# Patient Record
Sex: MALE | Race: BLACK OR AFRICAN AMERICAN | NOT HISPANIC OR LATINO | Employment: FULL TIME | URBAN - METROPOLITAN AREA
[De-identification: names, ages, dates, MRNs, and addresses within clinical notes are randomized per-mention and may not be internally consistent; named-entity substitution may affect disease eponyms.]

---

## 2023-05-14 ENCOUNTER — HOSPITAL ENCOUNTER (EMERGENCY)
Facility: HOSPITAL | Age: 53
Discharge: HOME | End: 2023-05-14
Attending: EMERGENCY MEDICINE | Admitting: EMERGENCY MEDICINE
Payer: COMMERCIAL

## 2023-05-14 ENCOUNTER — APPOINTMENT (OUTPATIENT)
Dept: RADIOLOGY | Facility: HOSPITAL | Age: 53
End: 2023-05-14
Attending: EMERGENCY MEDICINE
Payer: COMMERCIAL

## 2023-05-14 VITALS
BODY MASS INDEX: 25.18 KG/M2 | HEIGHT: 69 IN | WEIGHT: 170 LBS | HEART RATE: 81 BPM | DIASTOLIC BLOOD PRESSURE: 69 MMHG | TEMPERATURE: 97.9 F | OXYGEN SATURATION: 96 % | SYSTOLIC BLOOD PRESSURE: 131 MMHG | RESPIRATION RATE: 25 BRPM

## 2023-05-14 DIAGNOSIS — R07.89 CHEST WALL PAIN: ICD-10-CM

## 2023-05-14 LAB
ALBUMIN SERPL BCG-MCNC: 4.3 G/DL (ref 3.5–5.2)
ALP SERPL-CCNC: 76 U/L (ref 40–129)
ALT SERPL W P-5'-P-CCNC: 19 U/L (ref 10–50)
ANION GAP SERPL CALCULATED.3IONS-SCNC: 12 MMOL/L (ref 7–15)
AST SERPL W P-5'-P-CCNC: 28 U/L (ref 10–50)
BASOPHILS # BLD AUTO: 0 10E3/UL (ref 0–0.2)
BASOPHILS NFR BLD AUTO: 1 %
BILIRUB SERPL-MCNC: 0.2 MG/DL
BUN SERPL-MCNC: 10.7 MG/DL (ref 6–20)
CALCIUM SERPL-MCNC: 9.5 MG/DL (ref 8.6–10)
CHLORIDE SERPL-SCNC: 110 MMOL/L (ref 98–107)
CREAT SERPL-MCNC: 1.03 MG/DL (ref 0.67–1.17)
D DIMER PPP FEU-MCNC: 0.41 UG/ML FEU (ref 0–0.5)
DEPRECATED HCO3 PLAS-SCNC: 21 MMOL/L (ref 22–29)
EOSINOPHIL # BLD AUTO: 0.3 10E3/UL (ref 0–0.7)
EOSINOPHIL NFR BLD AUTO: 3 %
ERYTHROCYTE [DISTWIDTH] IN BLOOD BY AUTOMATED COUNT: 14 % (ref 10–15)
GFR SERPL CREATININE-BSD FRML MDRD: 87 ML/MIN/1.73M2
GLUCOSE SERPL-MCNC: 99 MG/DL (ref 70–99)
HCT VFR BLD AUTO: 35.5 % (ref 40–53)
HGB BLD-MCNC: 12.5 G/DL (ref 13.3–17.7)
HOLD SPECIMEN: NORMAL
IMM GRANULOCYTES # BLD: 0 10E3/UL
IMM GRANULOCYTES NFR BLD: 0 %
LIPASE SERPL-CCNC: 58 U/L (ref 13–60)
LYMPHOCYTES # BLD AUTO: 2.1 10E3/UL (ref 0.8–5.3)
LYMPHOCYTES NFR BLD AUTO: 26 %
MCH RBC QN AUTO: 30.2 PG (ref 26.5–33)
MCHC RBC AUTO-ENTMCNC: 35.2 G/DL (ref 31.5–36.5)
MCV RBC AUTO: 86 FL (ref 78–100)
MONOCYTES # BLD AUTO: 0.9 10E3/UL (ref 0–1.3)
MONOCYTES NFR BLD AUTO: 12 %
NEUTROPHILS # BLD AUTO: 4.6 10E3/UL (ref 1.6–8.3)
NEUTROPHILS NFR BLD AUTO: 58 %
NRBC # BLD AUTO: 0 10E3/UL
NRBC BLD AUTO-RTO: 0 /100
NT-PROBNP SERPL-MCNC: <36 PG/ML (ref 0–900)
PLATELET # BLD AUTO: 314 10E3/UL (ref 150–450)
POTASSIUM SERPL-SCNC: 4.3 MMOL/L (ref 3.4–5.3)
PROT SERPL-MCNC: 7.1 G/DL (ref 6.4–8.3)
RBC # BLD AUTO: 4.14 10E6/UL (ref 4.4–5.9)
SODIUM SERPL-SCNC: 143 MMOL/L (ref 136–145)
TROPONIN T SERPL HS-MCNC: 16 NG/L
TROPONIN T SERPL HS-MCNC: 19 NG/L
WBC # BLD AUTO: 7.9 10E3/UL (ref 4–11)

## 2023-05-14 PROCEDURE — 71046 X-RAY EXAM CHEST 2 VIEWS: CPT

## 2023-05-14 PROCEDURE — 83690 ASSAY OF LIPASE: CPT | Performed by: EMERGENCY MEDICINE

## 2023-05-14 PROCEDURE — 85379 FIBRIN DEGRADATION QUANT: CPT | Performed by: EMERGENCY MEDICINE

## 2023-05-14 PROCEDURE — 84484 ASSAY OF TROPONIN QUANT: CPT | Mod: 91 | Performed by: EMERGENCY MEDICINE

## 2023-05-14 PROCEDURE — 85025 COMPLETE CBC W/AUTO DIFF WBC: CPT | Performed by: EMERGENCY MEDICINE

## 2023-05-14 PROCEDURE — 250N000011 HC RX IP 250 OP 636: Performed by: EMERGENCY MEDICINE

## 2023-05-14 PROCEDURE — 80053 COMPREHEN METABOLIC PANEL: CPT | Performed by: EMERGENCY MEDICINE

## 2023-05-14 PROCEDURE — 84484 ASSAY OF TROPONIN QUANT: CPT | Performed by: EMERGENCY MEDICINE

## 2023-05-14 PROCEDURE — 83880 ASSAY OF NATRIURETIC PEPTIDE: CPT | Performed by: EMERGENCY MEDICINE

## 2023-05-14 PROCEDURE — 93005 ELECTROCARDIOGRAM TRACING: CPT | Performed by: EMERGENCY MEDICINE

## 2023-05-14 PROCEDURE — 99285 EMERGENCY DEPT VISIT HI MDM: CPT | Mod: 25

## 2023-05-14 PROCEDURE — 36415 COLL VENOUS BLD VENIPUNCTURE: CPT | Performed by: EMERGENCY MEDICINE

## 2023-05-14 PROCEDURE — 96374 THER/PROPH/DIAG INJ IV PUSH: CPT

## 2023-05-14 RX ORDER — KETOROLAC TROMETHAMINE 15 MG/ML
15 INJECTION, SOLUTION INTRAMUSCULAR; INTRAVENOUS ONCE
Status: COMPLETED | OUTPATIENT
Start: 2023-05-14 | End: 2023-05-14

## 2023-05-14 RX ADMIN — KETOROLAC TROMETHAMINE 15 MG: 15 INJECTION, SOLUTION INTRAMUSCULAR; INTRAVENOUS at 04:15

## 2023-05-14 ASSESSMENT — ACTIVITIES OF DAILY LIVING (ADL)
ADLS_ACUITY_SCORE: 35
ADLS_ACUITY_SCORE: 35

## 2023-05-14 NOTE — DISCHARGE INSTRUCTIONS
Follow-up with your primary care doctor next week for reevaluation.  Discuss your symptoms and whether they would like to do further outpatient evaluation of your heart.  You can take Tylenol and ibuprofen for pain at home.    Return to the ER for any new or worsening concerns.

## 2023-05-14 NOTE — ED NOTES
Bed: JNED-36  Expected date: 5/14/23  Expected time: 1:22 AM  Means of arrival: Ambulance  Comments:  SPF24 - 52yom cp , EKG unremarkable, vss, asa, ntg

## 2023-05-14 NOTE — ED TRIAGE NOTES
Pt c/o cp for the past 1.5 days; no pain with palpation, it is not reproduce able; sharp in nature and does not radiate; pt denies SOB and N/V; pt has cough; vitals from ems stable and EKG is unremarkable NSR. Pt takes 324mg ASA BID.     Triage Assessment     Row Name 05/14/23 0152       Triage Assessment (Adult)    Airway WDL WDL    Additional Documentation Breath Sounds (Group)       Respiratory WDL    Respiratory WDL WDL       Breath Sounds    Breath Sounds All Fields    All Lung Fields Breath Sounds clear       Skin Circulation/Temperature WDL    Skin Circulation/Temperature WDL WDL       Cardiac WDL    Cardiac WDL WDL       Peripheral/Neurovascular WDL    Peripheral Neurovascular WDL WDL       Cognitive/Neuro/Behavioral WDL    Cognitive/Neuro/Behavioral WDL WDL

## 2023-05-14 NOTE — ED PROVIDER NOTES
EMERGENCY DEPARTMENT ENCOUNTER      NAME: Emmett Mora  AGE: 52 year old male  YOB: 1970  MRN: [id number]  EVALUATION DATE & TIME: 5/14/2023  1:41 AM    PCP: No primary care provider on file.    ED PROVIDER: Tere Cerna MD      Chief Complaint   Patient presents with     Chest Pain     Pt c/o cp for the past 1.5 days; no pain with palpation, it is not reproduce able; sharp in nature and does not radiate; pt denies SOB and N/V; pt has cough; vitals from ems stable and EKG is unremarkable NSR. Pt takes 324mg ASA BID.         FINAL IMPRESSION:  1. Chest wall pain          ED COURSE & MEDICAL DECISION MAKING:    Pertinent Labs & Imaging studies reviewed. (See chart for details)    3:22 AM I introduced myself to the patient, obtained patient history, performed a physical exam, and discussed plan for ED workup including potential diagnostic laboratory/imaging studies and interventions.    52 year old male presents to the Emergency Department for evaluation of left sided chest pain.  On exam, patient is overall well-appearing and in no acute distress.  He does report that the pain on the left side of his chest does get worse when he moves his arms or legs in bed.  It is somewhat reproducible on exam along the left sternum.  He did have recent long travel to [place].  He already took a full aspirin today.  Vital signs are within normal limits and he is afebrile.  Differential diagnosis includes but is not limited to acute coronary syndrome, pericarditis, pneumonia, PE, pneumothorax, pulmonary edema, musculoskeletal etiology such as costochondritis, viral illness, less likely dissection with the fact that it is reproducible, etc.  EKG was obtained which does not reveal any evidence of acute ischemia.  No sign of pericarditis. Initial troponin is 19 with repeat being 16 both within normal limits and high-sensitivity troponins.  He is overall low risk heart score and do feel that he is appropriate for  outpatient follow-up for further cardiac evaluation as a result.  BNP is within normal limits making pulmonary edema less likely.  CBC reveals white blood cell count of 7.9 with a hemoglobin of 12.5.  CMP largely unremarkable.  Lipase within normal limits.  Does not have any abdominal tenderness thus felt that abdominal pathology is less likely.  Also considered GERD but overall with his presentation this does appear somewhat less likely as well.  Did obtain a D-dimer with his recent long travel which is within normal limits making PE unlikely.  Chest x-ray was obtained does not reveal any pneumonia, pneumothorax, or other acute pathology.  He was given 15 mg of IV Toradol here and states his pain is entirely resolved.  Do suspect a musculoskeletal component with the fact that  it is worse with movement of the chest and arm.  Discussed with the patient that we do not know the exact etiology of his pain.  He would like to be discharged at this time as he is feeling improved.  Discussed the importance of following up with primary care for reevaluation and possible further cardiac evaluation.  He was given indications for return the emergency department.  He voiced understanding was comfortable with this plan.  He was discharged in stable condition.         At the conclusion of the encounter I discussed the results of all of the tests and the disposition. The questions were answered. The patient or family acknowledged understanding and was agreeable with the care plan.         Medical Decision Making    History:    Supplemental history from: N/A     External Record(s) reviewed: Documented in chart, if applicable.    Work Up:    Chart documentation includes differential considered and any EKGs or imaging independently interpreted by provider.    In additional to work up documented, I considered the following work up: Documented in chart, if applicable.    External consultation:    Discussion of management with another  provider: Documented in chart, if applicable    Complicating factors:    Care impacted by chronic illness: Chronic Lung Disease    Care affected by social determinants of health: N/A    Disposition considerations: Discharge. No recommendations on prescription strength medication(s). See documentation for any additional details.      MEDICATIONS GIVEN IN THE EMERGENCY:  Medications   ketorolac (TORADOL) injection 15 mg (15 mg Intravenous $Given 5/14/23 6428)       NEW PRESCRIPTIONS STARTED AT TODAY'S ER VISIT  There are no discharge medications for this patient.         =================================================================    [facility]    Patient information was obtained from: Patient    Use of : N/A      Emmett Mora is a 52 year old male with a pertinent history of asthma who presents to this ED for evaluation of chest pain.    The patient reports 2 days of left sided chest pain. The pain has been constant since onset and is worse with movement or coughing. The pain is improved when lying on his right side. The pain is nonradiating. He has not had any associated shortness of breath, diaphoresis, or nausea. He denies any history of similar pain. He has tired Excedrin and aspirin for the pain without relief, last dose of Excedrin was 1800 yesterday. He has not sustained any trauma to the chest wall. He recently traveled to [place]. Reports some pleuritic nature to the pain. Not worse when lying down. Reports slight cough. He took a full aspirin today.     He reports a history of asthma and uses albuterol and steroid inhalers at home. He has not had any wheezing.    He has otherwise been feeling his normal self and denies any fever, vomiting, diarrhea, abdominal pain, focal numbness or weakness, back pain, and any other complaints at this time.    REVIEW OF SYSTEMS   Review of Systems   Pertinent positives and negatives are documented in the HPI. All other systems reviewed and are  "negative.      PAST MEDICAL HISTORY:  History reviewed. No pertinent past medical history.    PAST SURGICAL HISTORY:  History reviewed. No pertinent surgical history.        CURRENT MEDICATIONS:    No current outpatient medications on file.      ALLERGIES:  No Known Allergies    FAMILY HISTORY:  History reviewed. No pertinent family history.    SOCIAL HISTORY:        VITALS:  /69   Pulse 81   Temp 97.9  F (36.6  C) (Oral)   Resp 25   Ht 1.753 m (5' 9\")   Wt 77.1 kg (170 lb)   SpO2 96%   BMI 25.10 kg/m      PHYSICAL EXAM    Physical Exam  Constitutional: Well developed, Well nourished, NAD, GCS 15  HENT: Normocephalic, Atraumatic, Bilateral external ears normal, Oropharynx normal, mucous membranes moist, Nose normal. Neck- Normal range of motion, No tenderness, Supple, No stridor.   Eyes: PERRL, EOMI, Conjunctiva normal, No discharge.   Respiratory: Normal breath sounds, No respiratory distress, No wheezing or crackles, Speaks in full sentences easily.    Cardiovascular: Normal heart rate, Regular rhythm, No murmurs, No rubs, No gallops. 2+ radial pulses bilaterally. Does have some left sided reproducible chest tenderness along the sternum.   GI: Bowel sounds normal, Soft, No tenderness, No masses, No rebound or guarding. No CVA tenderness bilaterally   Musculoskeletal: 2+ DP pulses. No notable lower extremity edema. No cyanosis, No clubbing. Good range of motion in all major joints. No tenderness to palpation or major deformities noted. No tenderness of the CTLS spine.   Integument: Warm, Dry, No erythema, No rash. No petechiae.    Neurologic: Alert & oriented x 3, 5/5 strength in all 4 extremities bilaterally. Sensation intact to light touch in all 4 extremities and the face bilaterally. No focal deficits noted.     Psychiatric: Affect normal, Judgment normal, Mood normal. Cooperative.      LAB:  All pertinent labs reviewed and interpreted.  Results for orders placed or performed during the hospital " encounter of 05/14/23   Chest XR,  PA & LAT    Impression    IMPRESSION: The patient is rotated. Both lungs are clear. No adenopathy or effusion. Normal cardiac size and pulmonary vascularity. No fracture. Monitoring leads overlying the chest. No prior study available for comparison. Current study will serve as a   baseline for future follow-up.   Comprehensive metabolic panel   Result Value Ref Range    Sodium 143 136 - 145 mmol/L    Potassium 4.3 3.4 - 5.3 mmol/L    Chloride 110 (H) 98 - 107 mmol/L    Carbon Dioxide (CO2) 21 (L) 22 - 29 mmol/L    Anion Gap 12 7 - 15 mmol/L    Urea Nitrogen 10.7 6.0 - 20.0 mg/dL    Creatinine 1.03 0.67 - 1.17 mg/dL    Calcium 9.5 8.6 - 10.0 mg/dL    Glucose 99 70 - 99 mg/dL    Alkaline Phosphatase 76 40 - 129 U/L    AST 28 10 - 50 U/L    ALT 19 10 - 50 U/L    Protein Total 7.1 6.4 - 8.3 g/dL    Albumin 4.3 3.5 - 5.2 g/dL    Bilirubin Total 0.2 <=1.2 mg/dL    GFR Estimate 87 >60 mL/min/1.73m2   Result Value Ref Range    Troponin T, High Sensitivity 19 <=22 ng/L   Nt probnp inpatient (BNP)   Result Value Ref Range    N terminal Pro BNP Inpatient <36 0 - 900 pg/mL   CBC with platelets and differential   Result Value Ref Range    WBC Count 7.9 4.0 - 11.0 10e3/uL    RBC Count 4.14 (L) 4.40 - 5.90 10e6/uL    Hemoglobin 12.5 (L) 13.3 - 17.7 g/dL    Hematocrit 35.5 (L) 40.0 - 53.0 %    MCV 86 78 - 100 fL    MCH 30.2 26.5 - 33.0 pg    MCHC 35.2 31.5 - 36.5 g/dL    RDW 14.0 10.0 - 15.0 %    Platelet Count 314 150 - 450 10e3/uL    % Neutrophils 58 %    % Lymphocytes 26 %    % Monocytes 12 %    % Eosinophils 3 %    % Basophils 1 %    % Immature Granulocytes 0 %    NRBCs per 100 WBC 0 <1 /100    Absolute Neutrophils 4.6 1.6 - 8.3 10e3/uL    Absolute Lymphocytes 2.1 0.8 - 5.3 10e3/uL    Absolute Monocytes 0.9 0.0 - 1.3 10e3/uL    Absolute Eosinophils 0.3 0.0 - 0.7 10e3/uL    Absolute Basophils 0.0 0.0 - 0.2 10e3/uL    Absolute Immature Granulocytes 0.0 <=0.4 10e3/uL    Absolute NRBCs 0.0 10e3/uL    Extra Blue Top Tube   Result Value Ref Range    Hold Specimen JIC    Result Value Ref Range    Lipase 58 13 - 60 U/L   Result Value Ref Range    Troponin T, High Sensitivity 16 <=22 ng/L   D dimer quantitative   Result Value Ref Range    D-Dimer Quantitative 0.41 0.00 - 0.50 ug/mL FEU   ECG 12-LEAD WITH MUSE (LHE)   Result Value Ref Range    Systolic Blood Pressure  mmHg    Diastolic Blood Pressure  mmHg    Ventricular Rate 78 BPM    Atrial Rate 78 BPM    AR Interval 148 ms    QRS Duration 76 ms     ms    QTc 414 ms    P Axis 79 degrees    R AXIS 14 degrees    T Axis 57 degrees    Interpretation ECG       Sinus rhythm  Possible Inferior infarct , age undetermined  Abnormal ECG  No previous ECGs available  Confirmed by SEE ED PROVIDER NOTE FOR, ECG INTERPRETATION ([id number]),  TWYLA FARFAN ([id number]) on 5/15/2023 4:20:20 AM         RADIOLOGY:  Reviewed all pertinent imaging. Please see official radiology report.  Chest XR,  PA & LAT   Final Result   IMPRESSION: The patient is rotated. Both lungs are clear. No adenopathy or effusion. Normal cardiac size and pulmonary vascularity. No fracture. Monitoring leads overlying the chest. No prior study available for comparison. Current study will serve as a    baseline for future follow-up.          EKG:    Performed at: 1:42 am    Impression: Sinus rhythm. No signs of acute ischemia.     Rate: 78  Rhythm: Sinus  Axis: Normal  AR Interval: 148  QRS Interval: 76  QTc Interval: 414  ST Changes: None  Comparison: None    I have independently reviewed and interpreted the EKG(s) documented above.    PROCEDURES:   None      [employer] Documentation:   CMS Diagnoses:               IRobinson, am serving as a scribe to document services personally performed by Tere Cerna MD based on my observation and the provider's statements to me. ITere MD, attest that Robinson Bourgeois is acting in a scribe capacity, has observed my performance of  the services and has documented them in accordance with my direction.    Tere Cerna MD  [facility] EMERGENCY DEPARTMENT  [facility] [facility] [id number] [phone]     Tere Cerna MD  05/15/23 1165

## 2023-05-15 LAB
ATRIAL RATE - MUSE: 78 BPM
DIASTOLIC BLOOD PRESSURE - MUSE: NORMAL MMHG
INTERPRETATION ECG - MUSE: NORMAL
P AXIS - MUSE: 79 DEGREES
PR INTERVAL - MUSE: 148 MS
QRS DURATION - MUSE: 76 MS
QT - MUSE: 364 MS
QTC - MUSE: 414 MS
R AXIS - MUSE: 14 DEGREES
SYSTOLIC BLOOD PRESSURE - MUSE: NORMAL MMHG
T AXIS - MUSE: 57 DEGREES
VENTRICULAR RATE- MUSE: 78 BPM

## 2023-08-28 ENCOUNTER — HOSPITAL ENCOUNTER (EMERGENCY)
Facility: CLINIC | Age: 53
Discharge: HOME | End: 2023-08-28
Attending: EMERGENCY MEDICINE | Admitting: EMERGENCY MEDICINE
Payer: COMMERCIAL

## 2023-08-28 ENCOUNTER — APPOINTMENT (OUTPATIENT)
Dept: RADIOLOGY | Facility: CLINIC | Age: 53
End: 2023-08-28
Attending: EMERGENCY MEDICINE
Payer: COMMERCIAL

## 2023-08-28 ENCOUNTER — APPOINTMENT (OUTPATIENT)
Dept: CT IMAGING | Facility: CLINIC | Age: 53
End: 2023-08-28
Attending: EMERGENCY MEDICINE
Payer: COMMERCIAL

## 2023-08-28 VITALS
BODY MASS INDEX: 31.86 KG/M2 | TEMPERATURE: 98.4 F | WEIGHT: 203 LBS | DIASTOLIC BLOOD PRESSURE: 80 MMHG | SYSTOLIC BLOOD PRESSURE: 153 MMHG | OXYGEN SATURATION: 96 % | HEART RATE: 99 BPM | HEIGHT: 67 IN | RESPIRATION RATE: 18 BRPM

## 2023-08-28 DIAGNOSIS — J45.901 EXACERBATION OF ASTHMA, UNSPECIFIED ASTHMA SEVERITY, UNSPECIFIED WHETHER PERSISTENT: ICD-10-CM

## 2023-08-28 DIAGNOSIS — R07.9 CHEST PAIN, UNSPECIFIED TYPE: ICD-10-CM

## 2023-08-28 LAB
ALBUMIN SERPL-MCNC: 3.6 G/DL (ref 3.5–5)
ALP SERPL-CCNC: 77 U/L (ref 45–120)
ALT SERPL W P-5'-P-CCNC: 19 U/L (ref 0–45)
ANION GAP SERPL CALCULATED.3IONS-SCNC: 10 MMOL/L (ref 5–18)
AST SERPL W P-5'-P-CCNC: 22 U/L (ref 0–40)
ATRIAL RATE - MUSE: 97 BPM
BASOPHILS # BLD AUTO: 0 10E3/UL (ref 0–0.2)
BASOPHILS NFR BLD AUTO: 0 %
BILIRUB SERPL-MCNC: 0.4 MG/DL (ref 0–1)
BUN SERPL-MCNC: 9 MG/DL (ref 8–22)
CALCIUM SERPL-MCNC: 9.2 MG/DL (ref 8.5–10.5)
CHLORIDE BLD-SCNC: 109 MMOL/L (ref 98–107)
CO2 SERPL-SCNC: 24 MMOL/L (ref 22–31)
CREAT SERPL-MCNC: 0.95 MG/DL (ref 0.7–1.3)
D DIMER PPP FEU-MCNC: 0.54 UG/ML FEU (ref 0–0.5)
DIASTOLIC BLOOD PRESSURE - MUSE: NORMAL MMHG
EOSINOPHIL # BLD AUTO: 0.4 10E3/UL (ref 0–0.7)
EOSINOPHIL NFR BLD AUTO: 4 %
ERYTHROCYTE [DISTWIDTH] IN BLOOD BY AUTOMATED COUNT: 15.3 % (ref 10–15)
FLUAV RNA SPEC QL NAA+PROBE: NEGATIVE
FLUBV RNA RESP QL NAA+PROBE: NEGATIVE
GFR SERPL CREATININE-BSD FRML MDRD: >90 ML/MIN/1.73M2
GLUCOSE BLD-MCNC: 99 MG/DL (ref 70–125)
HCT VFR BLD AUTO: 34.9 % (ref 40–53)
HGB BLD-MCNC: 12.3 G/DL (ref 13.3–17.7)
IMM GRANULOCYTES # BLD: 0 10E3/UL
IMM GRANULOCYTES NFR BLD: 0 %
INTERPRETATION ECG - MUSE: NORMAL
LIPASE SERPL-CCNC: 75 U/L (ref 0–52)
LYMPHOCYTES # BLD AUTO: 1.9 10E3/UL (ref 0.8–5.3)
LYMPHOCYTES NFR BLD AUTO: 20 %
MCH RBC QN AUTO: 29.6 PG (ref 26.5–33)
MCHC RBC AUTO-ENTMCNC: 35.2 G/DL (ref 31.5–36.5)
MCV RBC AUTO: 84 FL (ref 78–100)
MONOCYTES # BLD AUTO: 0.8 10E3/UL (ref 0–1.3)
MONOCYTES NFR BLD AUTO: 8 %
NEUTROPHILS # BLD AUTO: 6.6 10E3/UL (ref 1.6–8.3)
NEUTROPHILS NFR BLD AUTO: 68 %
NRBC # BLD AUTO: 0 10E3/UL
NRBC BLD AUTO-RTO: 0 /100
P AXIS - MUSE: 79 DEGREES
PLATELET # BLD AUTO: 467 10E3/UL (ref 150–450)
POTASSIUM BLD-SCNC: 4.6 MMOL/L (ref 3.5–5)
PR INTERVAL - MUSE: 138 MS
PROT SERPL-MCNC: 7.6 G/DL (ref 6–8)
QRS DURATION - MUSE: 76 MS
QT - MUSE: 342 MS
QTC - MUSE: 434 MS
R AXIS - MUSE: 12 DEGREES
RBC # BLD AUTO: 4.16 10E6/UL (ref 4.4–5.9)
RSV RNA SPEC NAA+PROBE: NEGATIVE
SARS-COV-2 RNA RESP QL NAA+PROBE: NEGATIVE
SODIUM SERPL-SCNC: 143 MMOL/L (ref 136–145)
SYSTOLIC BLOOD PRESSURE - MUSE: NORMAL MMHG
T AXIS - MUSE: 47 DEGREES
TROPONIN I SERPL-MCNC: <0.01 NG/ML (ref 0–0.29)
TROPONIN I SERPL-MCNC: <0.01 NG/ML (ref 0–0.29)
VENTRICULAR RATE- MUSE: 97 BPM
WBC # BLD AUTO: 9.8 10E3/UL (ref 4–11)

## 2023-08-28 PROCEDURE — 83690 ASSAY OF LIPASE: CPT | Performed by: EMERGENCY MEDICINE

## 2023-08-28 PROCEDURE — 84484 ASSAY OF TROPONIN QUANT: CPT | Performed by: EMERGENCY MEDICINE

## 2023-08-28 PROCEDURE — 85025 COMPLETE CBC W/AUTO DIFF WBC: CPT | Performed by: EMERGENCY MEDICINE

## 2023-08-28 PROCEDURE — 85379 FIBRIN DEGRADATION QUANT: CPT | Performed by: EMERGENCY MEDICINE

## 2023-08-28 PROCEDURE — 71046 X-RAY EXAM CHEST 2 VIEWS: CPT

## 2023-08-28 PROCEDURE — 71275 CT ANGIOGRAPHY CHEST: CPT

## 2023-08-28 PROCEDURE — 80053 COMPREHEN METABOLIC PANEL: CPT | Performed by: EMERGENCY MEDICINE

## 2023-08-28 PROCEDURE — 36415 COLL VENOUS BLD VENIPUNCTURE: CPT | Performed by: EMERGENCY MEDICINE

## 2023-08-28 PROCEDURE — 250N000009 HC RX 250: Performed by: EMERGENCY MEDICINE

## 2023-08-28 PROCEDURE — 94640 AIRWAY INHALATION TREATMENT: CPT

## 2023-08-28 PROCEDURE — 87637 SARSCOV2&INF A&B&RSV AMP PRB: CPT | Performed by: EMERGENCY MEDICINE

## 2023-08-28 PROCEDURE — 250N000011 HC RX IP 250 OP 636: Mod: JZ | Performed by: EMERGENCY MEDICINE

## 2023-08-28 PROCEDURE — 250N000012 HC RX MED GY IP 250 OP 636 PS 637: Performed by: EMERGENCY MEDICINE

## 2023-08-28 PROCEDURE — 93005 ELECTROCARDIOGRAM TRACING: CPT | Performed by: EMERGENCY MEDICINE

## 2023-08-28 PROCEDURE — 99285 EMERGENCY DEPT VISIT HI MDM: CPT | Mod: 25

## 2023-08-28 RX ORDER — IOPAMIDOL 755 MG/ML
75 INJECTION, SOLUTION INTRAVASCULAR ONCE
Status: COMPLETED | OUTPATIENT
Start: 2023-08-28 | End: 2023-08-28

## 2023-08-28 RX ORDER — PREDNISONE 20 MG/1
TABLET ORAL
Qty: 10 TABLET | Refills: 0 | Status: SHIPPED | OUTPATIENT
Start: 2023-08-28

## 2023-08-28 RX ORDER — IOPAMIDOL 755 MG/ML
100 INJECTION, SOLUTION INTRAVASCULAR ONCE
Status: DISCONTINUED | OUTPATIENT
Start: 2023-08-28 | End: 2023-08-28

## 2023-08-28 RX ORDER — PREDNISONE 20 MG/1
60 TABLET ORAL ONCE
Status: COMPLETED | OUTPATIENT
Start: 2023-08-28 | End: 2023-08-28

## 2023-08-28 RX ORDER — IPRATROPIUM BROMIDE AND ALBUTEROL SULFATE 2.5; .5 MG/3ML; MG/3ML
3 SOLUTION RESPIRATORY (INHALATION) ONCE
Status: COMPLETED | OUTPATIENT
Start: 2023-08-28 | End: 2023-08-28

## 2023-08-28 RX ORDER — ALBUTEROL SULFATE 90 UG/1
2 AEROSOL, METERED RESPIRATORY (INHALATION) EVERY 6 HOURS PRN
Qty: 18 G | Refills: 0 | Status: SHIPPED | OUTPATIENT
Start: 2023-08-28

## 2023-08-28 RX ADMIN — PREDNISONE 60 MG: 20 TABLET ORAL at 19:37

## 2023-08-28 RX ADMIN — IPRATROPIUM BROMIDE AND ALBUTEROL SULFATE 3 ML: .5; 3 SOLUTION RESPIRATORY (INHALATION) at 19:37

## 2023-08-28 RX ADMIN — IOPAMIDOL 75 ML: 755 INJECTION, SOLUTION INTRAVENOUS at 21:05

## 2023-08-28 ASSESSMENT — ACTIVITIES OF DAILY LIVING (ADL)
ADLS_ACUITY_SCORE: 35
ADLS_ACUITY_SCORE: 35

## 2023-08-28 ASSESSMENT — HEART SCORE
HEART SCORE: 3
ECG: NORMAL
AGE: 45-64
RISK FACTORS: >2 RISK FACTORS OR HX OF ATHEROSCLEROTIC DISEASE
HISTORY: SLIGHTLY SUSPICIOUS
TROPONIN: LESS THAN OR EQUAL TO NORMAL LIMIT

## 2023-08-28 NOTE — ED TRIAGE NOTES
"Patient presents to ED with numbness to legs and arms for the past weeks, having some intermittent SOB and chest pain since yesterday, and intermittent feeling that it is \"hot behind my eyes\".  Kena Andrade RN.......8/28/2023 5:22 PM     Triage Assessment       Row Name 08/28/23 1774       Triage Assessment (Adult)    Airway WDL WDL       Respiratory WDL    Respiratory WDL WDL       Skin Circulation/Temperature WDL    Skin Circulation/Temperature WDL WDL       Cardiac WDL    Cardiac WDL X;chest pain       Peripheral/Neurovascular WDL    Peripheral Neurovascular WDL WDL       Cognitive/Neuro/Behavioral WDL    Cognitive/Neuro/Behavioral WDL WDL                    "

## 2023-08-28 NOTE — Clinical Note
Emmett Mora was seen and treated in our emergency department on 8/28/2023.  He may return to work on 08/30/2023.       If you have any questions or concerns, please don't hesitate to call.      Marissa Carrera MD

## 2023-08-28 NOTE — ED PROVIDER NOTES
"EMERGENCY DEPARTMENT ENCOUNTER      NAME: Emmett Mora  AGE: 52 year old male  YOB: 1970  MRN: [id number]  EVALUATION DATE & TIME: No admission date for patient encounter.    PCP: No Ref-Primary, Physician    ED PROVIDER: Marissa Carrera M.D.      Chief Complaint   Patient presents with    Chest Pain    Shortness of Breath    Numbness         FINAL IMPRESSION:  1. Exacerbation of asthma, unspecified asthma severity, unspecified whether persistent    2. Chest pain, unspecified type          ED COURSE & MEDICAL DECISION MAKING:    ED Course as of 08/28/23 2304   Mon Aug 28, 2023   1806 Patient with atypical HEART score 3 chest pains for one week, has had them before, and also some SOB with exertion with known asthma and some wheezing, sat 96% RA and RR 18 without retractions, speaking in full sentences, using albuterol MDI without substantial lasting improvement, no recent antibiotic or steroid therapy used, and + daily tobacco abuse. Pt given prednisone orally with duoneb therapy begun, EKG reassuringly WNL, CXR pending. Patient PERC+ as he is over 50 so ddimer pending for r/o PE and troponin pending with CBC and chemistry and will reassess, viral PCR ordered.   1933 Viral PCR negative, CMP and CBC WNL and lipase at upper limit of normal, LFTs WNL, ddimer elevated and CTA ordered to r/o PE. Initial EKG WNL and troponin negative, repeat troponin ordered after 3h delta interval to r/o ACS   1934 CXR reassuringly negative for acute pathology   2236 Repeat troponin reassuringly negative.    2237 CT chest reads \" Bilateral nonspecific upper lobe ground glass infiltrates may represent multifocal interstitial pneumonia (including Covid pneumonitis). Differential considerations include hypersensitivity pneumonia among other etiologies. Clinical correlation and   short-term follow-up post therapy recommended.\" However no cough or fever or elevated WBC to suggest atypical PNA, possibly reactive airway given viral " PCR negative vs. Sequelae of recent viral URTI. Will reassess now.   2572 Patient clinically reassessed with full resolution of wheezing and chest discomfort and feeling fully improved. 2nd troponin negative after 3h delta interval, ACS ruled out with low pretest probability for ACS per HEART score. As he feels markedly improved after steroid and neb therapy and other pathology ruled out reassuringly, he is open to plan to discharge with Rx for refill albuterol MDI inhaler and Rx for prednisone burst therapy for the next 5 days to treat asthma exacerbation which may be superimposed upon viral URTI, Rx to preferred pharmacy. Patient discharged after being provided with extensive anticipatory guidance and given return precautions, importance of PMD follow-up emphasized.        Pertinent Labs & Imaging studies reviewed. (See chart for details)    N95 worn  A face shield was worn also  COVID PPE    Medical Decision Making    History:  Supplemental history from: Documented in chart, if applicable  External Record(s) reviewed: Documented in chart, if applicable.    Work Up:  Chart documentation includes differential considered and any EKGs or imaging independently interpreted by provider, where specified.  In additional to work up documented, I considered the following work up: Documented in chart, if applicable.    External consultation:  Discussion of management with another provider: Documented in chart, if applicable    Complicating factors:  Care impacted by chronic illness: N/A  Care affected by social determinants of health: N/A    Disposition considerations: Discharge. I prescribed additional prescription strength medication(s) as charted. I considered admission, but discharged patient after significant clinical improvement.        At the conclusion of the encounter I discussed the results of all of the tests and the disposition. The questions were answered. The patient or family acknowledged understanding and was  agreeable with the care plan.     MEDICATIONS GIVEN IN THE EMERGENCY:  Medications   predniSONE (DELTASONE) tablet 60 mg (60 mg Oral $Given 8/28/23 1937)   ipratropium - albuterol 0.5 mg/2.5 mg/3 mL (DUONEB) neb solution 3 mL (3 mLs Nebulization $Given 8/28/23 1937)   iopamidol (ISOVUE-370) solution 75 mL (75 mLs Intravenous $Given 8/28/23 2105)       NEW PRESCRIPTIONS STARTED AT TODAY'S ER VISIT  New Prescriptions    ALBUTEROL (PROAIR HFA/PROVENTIL HFA/VENTOLIN HFA) 108 (90 BASE) MCG/ACT INHALER    Inhale 2 puffs into the lungs every 6 hours as needed for shortness of breath or wheezing    PREDNISONE (DELTASONE) 20 MG TABLET    Take two tablets (= 40mg) each day for 5 (five) days          =================================================================    HPI      Emmett Mora is a 52 year old male with PMHx of mild intermittent asthma who presents to the ED today via walk in for evaluation of chest pain, shortness of breath and numbness.     The patient has been having difficulty breathing that started about 1 weeks ago. He does have history of asthma and last took his inhaler was prior to arrival. He endorses some diffuse chest pain and paresthesia to bilateral arms, no numbness. The chest pain comes and goes, when the pain comes it last only for a few minutes. The pain is describe as a cramp sensation. The pain comes frequently when he is sleeping. Currently, he does not have any chest pain 0/10. He admits to smoking cigarette. He otherwise denies any other medical concerns at the moment.     REVIEW OF SYSTEMS   All other systems reviewed and are negative except as noted above in HPI.    PAST MEDICAL HISTORY:  History reviewed. No pertinent past medical history.    PAST SURGICAL HISTORY:  History reviewed. No pertinent surgical history.    CURRENT MEDICATIONS:    albuterol (PROAIR HFA/PROVENTIL HFA/VENTOLIN HFA) 108 (90 Base) MCG/ACT inhaler  predniSONE (DELTASONE) 20 MG tablet        ALLERGIES:  No Known  "Allergies    FAMILY HISTORY:  History reviewed. No pertinent family history.    SOCIAL HISTORY:   Social History     Socioeconomic History    Marital status: Single       VITALS:  Patient Vitals for the past 24 hrs:   BP Temp Temp src Pulse Resp SpO2 Height Weight   08/28/23 1719 (!) 153/80 98.4  F (36.9  C) Oral 99 18 96 % 1.702 m (5' 7\") 92.1 kg (203 lb)       PHYSICAL EXAM    GENERAL: Awake, alert.  In no acute distress.   HEENT: Normocephalic, atraumatic.  Pupils equal, round and reactive.  Conjunctiva normal.  EOMI.  NECK: No stridor or apparent deformity.  PULMONARY: Diffuse mild wheezing. Symmetrical breath sounds without distress.   CARDIO: Regular rate and rhythm.  No significant murmur, rub or gallop.  Radial pulses strong and symmetrical.  ABDOMINAL: Abdomen soft, non-distended and non-tender to palpation.  No CVAT, no palpable hepatosplenomegaly.  EXTREMITIES: No lower extremity swelling or edema.    NEURO: Alert and oriented to person, place and time.  Cranial nerves grossly intact.  No focal motor deficit.  PSYCH: Normal mood and affect  SKIN: No rashes      LAB:  All pertinent labs reviewed and interpreted.  Results for orders placed or performed during the hospital encounter of 08/28/23   XR Chest 2 Views    Impression    IMPRESSION: Negative chest.   CT Chest Pulmonary Embolism w Contrast    Impression    IMPRESSION:  1.  No evidence pulmonary embolus.  2.  Bilateral nonspecific upper lobe ground glass infiltrates may represent multifocal interstitial pneumonia (including Covid pneumonitis). Differential considerations include hypersensitivity pneumonia among other etiologies. Clinical correlation and   short-term follow-up post therapy recommended..   Comprehensive metabolic panel   Result Value Ref Range    Sodium 143 136 - 145 mmol/L    Potassium 4.6 3.5 - 5.0 mmol/L    Chloride 109 (H) 98 - 107 mmol/L    Carbon Dioxide (CO2) 24 22 - 31 mmol/L    Anion Gap 10 5 - 18 mmol/L    Urea Nitrogen 9 8 - " 22 mg/dL    Creatinine 0.95 0.70 - 1.30 mg/dL    Calcium 9.2 8.5 - 10.5 mg/dL    Glucose 99 70 - 125 mg/dL    Alkaline Phosphatase 77 45 - 120 U/L    AST 22 0 - 40 U/L    ALT 19 0 - 45 U/L    Protein Total 7.6 6.0 - 8.0 g/dL    Albumin 3.6 3.5 - 5.0 g/dL    Bilirubin Total 0.4 0.0 - 1.0 mg/dL    GFR Estimate >90 >60 mL/min/1.73m2   Result Value Ref Range    Lipase 75 (H) 0 - 52 U/L   Result Value Ref Range    Troponin I <0.01 0.00 - 0.29 ng/mL   D dimer quantitative   Result Value Ref Range    D-Dimer Quantitative 0.54 (H) 0.00 - 0.50 ug/mL FEU   Symptomatic Influenza A/B, RSV, & SARS-CoV2 PCR (COVID-19) Nasopharyngeal    Specimen: Nasopharyngeal; Swab   Result Value Ref Range    Influenza A PCR Negative Negative    Influenza B PCR Negative Negative    RSV PCR Negative Negative    SARS CoV2 PCR Negative Negative   CBC with platelets and differential   Result Value Ref Range    WBC Count 9.8 4.0 - 11.0 10e3/uL    RBC Count 4.16 (L) 4.40 - 5.90 10e6/uL    Hemoglobin 12.3 (L) 13.3 - 17.7 g/dL    Hematocrit 34.9 (L) 40.0 - 53.0 %    MCV 84 78 - 100 fL    MCH 29.6 26.5 - 33.0 pg    MCHC 35.2 31.5 - 36.5 g/dL    RDW 15.3 (H) 10.0 - 15.0 %    Platelet Count 467 (H) 150 - 450 10e3/uL    % Neutrophils 68 %    % Lymphocytes 20 %    % Monocytes 8 %    % Eosinophils 4 %    % Basophils 0 %    % Immature Granulocytes 0 %    NRBCs per 100 WBC 0 <1 /100    Absolute Neutrophils 6.6 1.6 - 8.3 10e3/uL    Absolute Lymphocytes 1.9 0.8 - 5.3 10e3/uL    Absolute Monocytes 0.8 0.0 - 1.3 10e3/uL    Absolute Eosinophils 0.4 0.0 - 0.7 10e3/uL    Absolute Basophils 0.0 0.0 - 0.2 10e3/uL    Absolute Immature Granulocytes 0.0 <=0.4 10e3/uL    Absolute NRBCs 0.0 10e3/uL   Result Value Ref Range    Troponin I <0.01 0.00 - 0.29 ng/mL   ECG 12-LEAD WITH MUSE ([facility])   Result Value Ref Range    Systolic Blood Pressure  mmHg    Diastolic Blood Pressure  mmHg    Ventricular Rate 97 BPM    Atrial Rate 97 BPM    CT Interval 138 ms    QRS Duration 76 ms      ms    QTc 434 ms    P Axis 79 degrees    R AXIS 12 degrees    T Axis 47 degrees    Interpretation ECG       Sinus rhythm  Normal ECG  When compared with ECG of 14-MAY-2023 01:42,  No significant change was found  Confirmed by SEE ED PROVIDER NOTE FOR, ECG INTERPRETATION ([id number]),  Sofia Gan ([id number]) on 8/28/2023 7:26:13 PM         RADIOLOGY:  Reviewed all pertinent imaging. Please see official radiology report.  CT Chest Pulmonary Embolism w Contrast   Final Result   IMPRESSION:   1.  No evidence pulmonary embolus.   2.  Bilateral nonspecific upper lobe ground glass infiltrates may represent multifocal interstitial pneumonia (including Covid pneumonitis). Differential considerations include hypersensitivity pneumonia among other etiologies. Clinical correlation and    short-term follow-up post therapy recommended..      XR Chest 2 Views   Final Result   IMPRESSION: Negative chest.            EKG:    Reviewed and interpreted as: 28-AUG-2023 17:20, Sinus rhythm with normal ECG. 97 BPM. When compared with ECG of 14-MAY-2023 01:42, No significant changes was found.       I have independently reviewed and interpreted the EKG(s) documented above.        I, Jen Hutchinson, am serving as a scribe to document services personally performed by Dr. Marissa Carrera based on my observation and the provider's statements to me. I, Marissa Carrera MD attest that Jen Hutchinson is acting in a scribe capacity, has observed my performance of the services and has documented them in accordance with my direction.       Marissa Carrera MD  08/28/23 1720

## 2025-04-30 ENCOUNTER — APPOINTMENT (OUTPATIENT)
Dept: RADIOLOGY | Facility: CLINIC | Age: 55
End: 2025-04-30
Attending: EMERGENCY MEDICINE
Payer: COMMERCIAL

## 2025-04-30 ENCOUNTER — HOSPITAL ENCOUNTER (OUTPATIENT)
Facility: CLINIC | Age: 55
Setting detail: OBSERVATION
Discharge: HOME | End: 2025-05-01
Attending: EMERGENCY MEDICINE | Admitting: INTERNAL MEDICINE
Payer: COMMERCIAL

## 2025-04-30 DIAGNOSIS — R06.00 DYSPNEA, UNSPECIFIED TYPE: ICD-10-CM

## 2025-04-30 DIAGNOSIS — J45.31 MILD PERSISTENT ASTHMA WITH ACUTE EXACERBATION: ICD-10-CM

## 2025-04-30 DIAGNOSIS — E11.65 HYPERGLYCEMIA DUE TO DIABETES MELLITUS (H): ICD-10-CM

## 2025-04-30 DIAGNOSIS — E11.10 DIABETIC KETOACIDOSIS WITHOUT COMA ASSOCIATED WITH TYPE 2 DIABETES MELLITUS (H): ICD-10-CM

## 2025-04-30 DIAGNOSIS — E86.0 DEHYDRATION: ICD-10-CM

## 2025-04-30 LAB
ALBUMIN SERPL BCG-MCNC: 4.4 G/DL (ref 3.5–5.2)
ALBUMIN UR-MCNC: NEGATIVE MG/DL
ALP SERPL-CCNC: 185 U/L (ref 40–150)
ALT SERPL W P-5'-P-CCNC: 46 U/L (ref 0–70)
ANION GAP SERPL CALCULATED.3IONS-SCNC: 17 MMOL/L (ref 7–15)
APPEARANCE UR: CLEAR
AST SERPL W P-5'-P-CCNC: 23 U/L (ref 0–45)
B-OH-BUTYR SERPL-SCNC: 2.06 MMOL/L
BASE EXCESS BLDV CALC-SCNC: -1.4 MMOL/L (ref -3–3)
BASOPHILS # BLD AUTO: 0 10E3/UL (ref 0–0.2)
BASOPHILS NFR BLD AUTO: 0 %
BILIRUB SERPL-MCNC: 0.3 MG/DL
BILIRUB UR QL STRIP: NEGATIVE
BUN SERPL-MCNC: 25.3 MG/DL (ref 6–20)
CALCIUM SERPL-MCNC: 9.9 MG/DL (ref 8.8–10.4)
CHLORIDE SERPL-SCNC: 86 MMOL/L (ref 98–107)
COLOR UR AUTO: COLORLESS
CREAT SERPL-MCNC: 1.2 MG/DL (ref 0.67–1.17)
EGFRCR SERPLBLD CKD-EPI 2021: 71 ML/MIN/1.73M2
EOSINOPHIL # BLD AUTO: 0 10E3/UL (ref 0–0.7)
EOSINOPHIL NFR BLD AUTO: 0 %
ERYTHROCYTE [DISTWIDTH] IN BLOOD BY AUTOMATED COUNT: 13.9 % (ref 10–15)
GLUCOSE BLDC GLUCOMTR-MCNC: >600 MG/DL (ref 70–99)
GLUCOSE SERPL-MCNC: 847 MG/DL (ref 70–99)
GLUCOSE UR STRIP-MCNC: >1000 MG/DL
HCO3 BLDV-SCNC: 23 MMOL/L (ref 21–28)
HCO3 SERPL-SCNC: 20 MMOL/L (ref 22–29)
HCT VFR BLD AUTO: 35.7 % (ref 40–53)
HGB BLD-MCNC: 13.3 G/DL (ref 13.3–17.7)
HGB UR QL STRIP: NEGATIVE
HOLD SPECIMEN: NORMAL
HOLD SPECIMEN: NORMAL
IMM GRANULOCYTES # BLD: 0.1 10E3/UL
IMM GRANULOCYTES NFR BLD: 1 %
KETONES UR STRIP-MCNC: 10 MG/DL
LACTATE SERPL-SCNC: 1.4 MMOL/L (ref 0.7–2)
LEUKOCYTE ESTERASE UR QL STRIP: NEGATIVE
LYMPHOCYTES # BLD AUTO: 2 10E3/UL (ref 0.8–5.3)
LYMPHOCYTES NFR BLD AUTO: 23 %
MAGNESIUM SERPL-MCNC: 2.5 MG/DL (ref 1.7–2.3)
MCH RBC QN AUTO: 30.7 PG (ref 26.5–33)
MCHC RBC AUTO-ENTMCNC: 37.3 G/DL (ref 31.5–36.5)
MCV RBC AUTO: 82 FL (ref 78–100)
MONOCYTES # BLD AUTO: 0.6 10E3/UL (ref 0–1.3)
MONOCYTES NFR BLD AUTO: 7 %
MUCOUS THREADS #/AREA URNS LPF: PRESENT /LPF
NEUTROPHILS # BLD AUTO: 5.9 10E3/UL (ref 1.6–8.3)
NEUTROPHILS NFR BLD AUTO: 69 %
NITRATE UR QL: NEGATIVE
NRBC # BLD AUTO: 0 10E3/UL
NRBC BLD AUTO-RTO: 0 /100
O2/TOTAL GAS SETTING VFR VENT: 21 %
OXYHGB MFR BLDV: 89 % (ref 70–75)
PCO2 BLDV: 38 MM HG (ref 40–50)
PH BLDV: 7.39 [PH] (ref 7.32–7.43)
PH UR STRIP: 5 [PH] (ref 5–7)
PLATELET # BLD AUTO: 273 10E3/UL (ref 150–450)
PO2 BLDV: 73 MM HG (ref 25–47)
POTASSIUM SERPL-SCNC: 5.2 MMOL/L (ref 3.4–5.3)
PROT SERPL-MCNC: 6.9 G/DL (ref 6.4–8.3)
RBC # BLD AUTO: 4.33 10E6/UL (ref 4.4–5.9)
RBC URINE: <1 /HPF
SAO2 % BLDV: 95 % (ref 70–75)
SODIUM SERPL-SCNC: 123 MMOL/L (ref 135–145)
SP GR UR STRIP: 1.02 (ref 1–1.03)
SQUAMOUS EPITHELIAL: <1 /HPF
T4 FREE SERPL-MCNC: 1.3 NG/DL (ref 0.9–1.7)
TROPONIN T SERPL HS-MCNC: 16 NG/L
TSH SERPL DL<=0.005 MIU/L-ACNC: 0.26 UIU/ML (ref 0.3–4.2)
UROBILINOGEN UR STRIP-MCNC: NORMAL MG/DL
WBC # BLD AUTO: 8.6 10E3/UL (ref 4–11)
WBC URINE: <1 /HPF

## 2025-04-30 PROCEDURE — 82962 GLUCOSE BLOOD TEST: CPT

## 2025-04-30 PROCEDURE — 258N000003 HC RX IP 258 OP 636: Performed by: EMERGENCY MEDICINE

## 2025-04-30 PROCEDURE — 84484 ASSAY OF TROPONIN QUANT: CPT | Performed by: EMERGENCY MEDICINE

## 2025-04-30 PROCEDURE — 93005 ELECTROCARDIOGRAM TRACING: CPT | Performed by: EMERGENCY MEDICINE

## 2025-04-30 PROCEDURE — 83930 ASSAY OF BLOOD OSMOLALITY: CPT | Performed by: INTERNAL MEDICINE

## 2025-04-30 PROCEDURE — 82805 BLOOD GASES W/O2 SATURATION: CPT | Performed by: EMERGENCY MEDICINE

## 2025-04-30 PROCEDURE — 85004 AUTOMATED DIFF WBC COUNT: CPT | Performed by: EMERGENCY MEDICINE

## 2025-04-30 PROCEDURE — 84439 ASSAY OF FREE THYROXINE: CPT | Performed by: EMERGENCY MEDICINE

## 2025-04-30 PROCEDURE — 250N000009 HC RX 250: Performed by: EMERGENCY MEDICINE

## 2025-04-30 PROCEDURE — 120N000013 HC R&B IMCU

## 2025-04-30 PROCEDURE — 999N000157 HC STATISTIC RCP TIME EA 10 MIN

## 2025-04-30 PROCEDURE — 83036 HEMOGLOBIN GLYCOSYLATED A1C: CPT | Performed by: EMERGENCY MEDICINE

## 2025-04-30 PROCEDURE — 94640 AIRWAY INHALATION TREATMENT: CPT

## 2025-04-30 PROCEDURE — 99223 1ST HOSP IP/OBS HIGH 75: CPT | Performed by: INTERNAL MEDICINE

## 2025-04-30 PROCEDURE — 83735 ASSAY OF MAGNESIUM: CPT | Performed by: EMERGENCY MEDICINE

## 2025-04-30 PROCEDURE — 83605 ASSAY OF LACTIC ACID: CPT | Performed by: EMERGENCY MEDICINE

## 2025-04-30 PROCEDURE — 81003 URINALYSIS AUTO W/O SCOPE: CPT | Performed by: EMERGENCY MEDICINE

## 2025-04-30 PROCEDURE — 99291 CRITICAL CARE FIRST HOUR: CPT | Mod: 25

## 2025-04-30 PROCEDURE — 96361 HYDRATE IV INFUSION ADD-ON: CPT

## 2025-04-30 PROCEDURE — 96360 HYDRATION IV INFUSION INIT: CPT

## 2025-04-30 PROCEDURE — 82247 BILIRUBIN TOTAL: CPT | Performed by: EMERGENCY MEDICINE

## 2025-04-30 PROCEDURE — 36415 COLL VENOUS BLD VENIPUNCTURE: CPT | Performed by: EMERGENCY MEDICINE

## 2025-04-30 PROCEDURE — 82010 KETONE BODYS QUAN: CPT | Performed by: EMERGENCY MEDICINE

## 2025-04-30 PROCEDURE — 84443 ASSAY THYROID STIM HORMONE: CPT | Performed by: EMERGENCY MEDICINE

## 2025-04-30 PROCEDURE — 71045 X-RAY EXAM CHEST 1 VIEW: CPT

## 2025-04-30 PROCEDURE — 83880 ASSAY OF NATRIURETIC PEPTIDE: CPT | Performed by: INTERNAL MEDICINE

## 2025-04-30 RX ORDER — ONDANSETRON 2 MG/ML
4 INJECTION INTRAMUSCULAR; INTRAVENOUS EVERY 30 MIN PRN
Status: DISCONTINUED | OUTPATIENT
Start: 2025-04-30 | End: 2025-05-01

## 2025-04-30 RX ORDER — METFORMIN HYDROCHLORIDE 500 MG/1
1000 TABLET, EXTENDED RELEASE ORAL ONCE
Status: DISCONTINUED | OUTPATIENT
Start: 2025-04-30 | End: 2025-04-30

## 2025-04-30 RX ORDER — IPRATROPIUM BROMIDE AND ALBUTEROL SULFATE 2.5; .5 MG/3ML; MG/3ML
3 SOLUTION RESPIRATORY (INHALATION) ONCE
Status: COMPLETED | OUTPATIENT
Start: 2025-04-30 | End: 2025-04-30

## 2025-04-30 RX ORDER — DEXTROSE MONOHYDRATE AND SODIUM CHLORIDE 5; .45 G/100ML; G/100ML
1000 INJECTION, SOLUTION INTRAVENOUS CONTINUOUS PRN
Status: DISCONTINUED | OUTPATIENT
Start: 2025-04-30 | End: 2025-05-01

## 2025-04-30 RX ORDER — DEXTROSE MONOHYDRATE 25 G/50ML
25-50 INJECTION, SOLUTION INTRAVENOUS
Status: DISCONTINUED | OUTPATIENT
Start: 2025-04-30 | End: 2025-05-01

## 2025-04-30 RX ADMIN — SODIUM CHLORIDE 1000 ML: 9 INJECTION, SOLUTION INTRAVENOUS at 22:46

## 2025-04-30 RX ADMIN — IPRATROPIUM BROMIDE AND ALBUTEROL SULFATE 3 ML: .5; 3 SOLUTION RESPIRATORY (INHALATION) at 23:24

## 2025-04-30 ASSESSMENT — ENCOUNTER SYMPTOMS
ABDOMINAL PAIN: 0
DIZZINESS: 1
POLYDIPSIA: 1
NAUSEA: 0
FATIGUE: 1

## 2025-04-30 ASSESSMENT — ACTIVITIES OF DAILY LIVING (ADL): ADLS_ACUITY_SCORE: 41

## 2025-04-30 ASSESSMENT — COLUMBIA-SUICIDE SEVERITY RATING SCALE - C-SSRS
1. IN THE PAST MONTH, HAVE YOU WISHED YOU WERE DEAD OR WISHED YOU COULD GO TO SLEEP AND NOT WAKE UP?: NO
6. HAVE YOU EVER DONE ANYTHING, STARTED TO DO ANYTHING, OR PREPARED TO DO ANYTHING TO END YOUR LIFE?: NO
2. HAVE YOU ACTUALLY HAD ANY THOUGHTS OF KILLING YOURSELF IN THE PAST MONTH?: NO

## 2025-05-01 VITALS
OXYGEN SATURATION: 96 % | TEMPERATURE: 97.9 F | HEART RATE: 85 BPM | WEIGHT: 207.5 LBS | BODY MASS INDEX: 32.57 KG/M2 | SYSTOLIC BLOOD PRESSURE: 144 MMHG | HEIGHT: 67 IN | RESPIRATION RATE: 20 BRPM | DIASTOLIC BLOOD PRESSURE: 80 MMHG

## 2025-05-01 LAB
ANION GAP SERPL CALCULATED.3IONS-SCNC: 12 MMOL/L (ref 7–15)
ANION GAP SERPL CALCULATED.3IONS-SCNC: 13 MMOL/L (ref 7–15)
ANION GAP SERPL CALCULATED.3IONS-SCNC: 16 MMOL/L (ref 7–15)
ATRIAL RATE - MUSE: 93 BPM
BUN SERPL-MCNC: 15.8 MG/DL (ref 6–20)
BUN SERPL-MCNC: 17.9 MG/DL (ref 6–20)
BUN SERPL-MCNC: 21.6 MG/DL (ref 6–20)
CALCIUM SERPL-MCNC: 8.2 MG/DL (ref 8.8–10.4)
CALCIUM SERPL-MCNC: 8.6 MG/DL (ref 8.8–10.4)
CALCIUM SERPL-MCNC: 8.7 MG/DL (ref 8.8–10.4)
CHLORIDE SERPL-SCNC: 104 MMOL/L (ref 98–107)
CHLORIDE SERPL-SCNC: 105 MMOL/L (ref 98–107)
CHLORIDE SERPL-SCNC: 98 MMOL/L (ref 98–107)
CREAT SERPL-MCNC: 0.97 MG/DL (ref 0.67–1.17)
CREAT SERPL-MCNC: 1 MG/DL (ref 0.67–1.17)
CREAT SERPL-MCNC: 1.01 MG/DL (ref 0.67–1.17)
DIASTOLIC BLOOD PRESSURE - MUSE: NORMAL MMHG
EGFRCR SERPLBLD CKD-EPI 2021: 88 ML/MIN/1.73M2
EGFRCR SERPLBLD CKD-EPI 2021: 89 ML/MIN/1.73M2
EGFRCR SERPLBLD CKD-EPI 2021: >90 ML/MIN/1.73M2
EST. AVERAGE GLUCOSE BLD GHB EST-MCNC: 280 MG/DL
FLUAV RNA SPEC QL NAA+PROBE: NEGATIVE
FLUBV RNA RESP QL NAA+PROBE: NEGATIVE
GLUCOSE BLDC GLUCOMTR-MCNC: 135 MG/DL (ref 70–99)
GLUCOSE BLDC GLUCOMTR-MCNC: 144 MG/DL (ref 70–99)
GLUCOSE BLDC GLUCOMTR-MCNC: 158 MG/DL (ref 70–99)
GLUCOSE BLDC GLUCOMTR-MCNC: 183 MG/DL (ref 70–99)
GLUCOSE BLDC GLUCOMTR-MCNC: 215 MG/DL (ref 70–99)
GLUCOSE BLDC GLUCOMTR-MCNC: 357 MG/DL (ref 70–99)
GLUCOSE BLDC GLUCOMTR-MCNC: 374 MG/DL (ref 70–99)
GLUCOSE BLDC GLUCOMTR-MCNC: 389 MG/DL (ref 70–99)
GLUCOSE SERPL-MCNC: 158 MG/DL (ref 70–99)
GLUCOSE SERPL-MCNC: 234 MG/DL (ref 70–99)
GLUCOSE SERPL-MCNC: 618 MG/DL (ref 70–99)
HBA1C MFR BLD: 11.4 %
HCO3 SERPL-SCNC: 18 MMOL/L (ref 22–29)
HCO3 SERPL-SCNC: 21 MMOL/L (ref 22–29)
HCO3 SERPL-SCNC: 21 MMOL/L (ref 22–29)
HOLD SPECIMEN: NORMAL
INTERPRETATION ECG - MUSE: NORMAL
NT-PROBNP SERPL-MCNC: <36 PG/ML (ref 0–900)
OSMOLALITY SERPL: 294 MMOL/KG (ref 275–295)
OSMOLALITY SERPL: 318 MMOL/KG (ref 275–295)
P AXIS - MUSE: 78 DEGREES
PHOSPHATE SERPL-MCNC: 2.8 MG/DL (ref 2.5–4.5)
PHOSPHATE SERPL-MCNC: 3.2 MG/DL (ref 2.5–4.5)
PHOSPHATE SERPL-MCNC: 3.5 MG/DL (ref 2.5–4.5)
POTASSIUM SERPL-SCNC: 3.3 MMOL/L (ref 3.4–5.3)
POTASSIUM SERPL-SCNC: 3.6 MMOL/L (ref 3.4–5.3)
POTASSIUM SERPL-SCNC: 3.7 MMOL/L (ref 3.4–5.3)
POTASSIUM SERPL-SCNC: 4.4 MMOL/L (ref 3.4–5.3)
PR INTERVAL - MUSE: 152 MS
QRS DURATION - MUSE: 74 MS
QT - MUSE: 348 MS
QTC - MUSE: 432 MS
R AXIS - MUSE: 17 DEGREES
RSV RNA SPEC NAA+PROBE: NEGATIVE
SARS-COV-2 RNA RESP QL NAA+PROBE: NEGATIVE
SODIUM SERPL-SCNC: 132 MMOL/L (ref 135–145)
SODIUM SERPL-SCNC: 138 MMOL/L (ref 135–145)
SODIUM SERPL-SCNC: 138 MMOL/L (ref 135–145)
SYSTOLIC BLOOD PRESSURE - MUSE: NORMAL MMHG
T AXIS - MUSE: 33 DEGREES
TROPONIN T SERPL HS-MCNC: 16 NG/L
VENTRICULAR RATE- MUSE: 93 BPM

## 2025-05-01 PROCEDURE — 96361 HYDRATE IV INFUSION ADD-ON: CPT

## 2025-05-01 PROCEDURE — G0378 HOSPITAL OBSERVATION PER HR: HCPCS

## 2025-05-01 PROCEDURE — 250N000011 HC RX IP 250 OP 636: Performed by: INTERNAL MEDICINE

## 2025-05-01 PROCEDURE — 250N000009 HC RX 250: Performed by: INTERNAL MEDICINE

## 2025-05-01 PROCEDURE — 250N000009 HC RX 250: Performed by: EMERGENCY MEDICINE

## 2025-05-01 PROCEDURE — 84100 ASSAY OF PHOSPHORUS: CPT | Performed by: INTERNAL MEDICINE

## 2025-05-01 PROCEDURE — 99239 HOSP IP/OBS DSCHRG MGMT >30: CPT | Performed by: INTERNAL MEDICINE

## 2025-05-01 PROCEDURE — 250N000012 HC RX MED GY IP 250 OP 636 PS 637: Performed by: INTERNAL MEDICINE

## 2025-05-01 PROCEDURE — 250N000013 HC RX MED GY IP 250 OP 250 PS 637: Performed by: INTERNAL MEDICINE

## 2025-05-01 PROCEDURE — 84132 ASSAY OF SERUM POTASSIUM: CPT | Performed by: INTERNAL MEDICINE

## 2025-05-01 PROCEDURE — 82962 GLUCOSE BLOOD TEST: CPT

## 2025-05-01 PROCEDURE — 36415 COLL VENOUS BLD VENIPUNCTURE: CPT | Performed by: INTERNAL MEDICINE

## 2025-05-01 PROCEDURE — 96366 THER/PROPH/DIAG IV INF ADDON: CPT

## 2025-05-01 PROCEDURE — 258N000003 HC RX IP 258 OP 636: Performed by: INTERNAL MEDICINE

## 2025-05-01 PROCEDURE — 80048 BASIC METABOLIC PNL TOTAL CA: CPT | Performed by: INTERNAL MEDICINE

## 2025-05-01 PROCEDURE — 36415 COLL VENOUS BLD VENIPUNCTURE: CPT | Performed by: EMERGENCY MEDICINE

## 2025-05-01 PROCEDURE — 96365 THER/PROPH/DIAG IV INF INIT: CPT

## 2025-05-01 PROCEDURE — 96372 THER/PROPH/DIAG INJ SC/IM: CPT | Mod: XU | Performed by: INTERNAL MEDICINE

## 2025-05-01 PROCEDURE — 258N000002 HC RX IP 258 OP 250: Performed by: INTERNAL MEDICINE

## 2025-05-01 PROCEDURE — 83930 ASSAY OF BLOOD OSMOLALITY: CPT | Performed by: INTERNAL MEDICINE

## 2025-05-01 PROCEDURE — 87637 SARSCOV2&INF A&B&RSV AMP PRB: CPT | Performed by: EMERGENCY MEDICINE

## 2025-05-01 PROCEDURE — 84484 ASSAY OF TROPONIN QUANT: CPT | Performed by: EMERGENCY MEDICINE

## 2025-05-01 RX ORDER — ENOXAPARIN SODIUM 100 MG/ML
40 INJECTION SUBCUTANEOUS EVERY 24 HOURS
Status: DISCONTINUED | OUTPATIENT
Start: 2025-05-01 | End: 2025-05-01 | Stop reason: HOSPADM

## 2025-05-01 RX ORDER — AMOXICILLIN 250 MG
2 CAPSULE ORAL 2 TIMES DAILY PRN
Status: DISCONTINUED | OUTPATIENT
Start: 2025-05-01 | End: 2025-05-01 | Stop reason: HOSPADM

## 2025-05-01 RX ORDER — PROCHLORPERAZINE MALEATE 10 MG
10 TABLET ORAL EVERY 6 HOURS PRN
Status: DISCONTINUED | OUTPATIENT
Start: 2025-05-01 | End: 2025-05-01 | Stop reason: HOSPADM

## 2025-05-01 RX ORDER — CALCIUM CARBONATE 500 MG/1
1000 TABLET, CHEWABLE ORAL 4 TIMES DAILY PRN
Status: DISCONTINUED | OUTPATIENT
Start: 2025-05-01 | End: 2025-05-01 | Stop reason: HOSPADM

## 2025-05-01 RX ORDER — AMOXICILLIN 250 MG
1 CAPSULE ORAL 2 TIMES DAILY PRN
Status: DISCONTINUED | OUTPATIENT
Start: 2025-05-01 | End: 2025-05-01 | Stop reason: HOSPADM

## 2025-05-01 RX ORDER — NICOTINE POLACRILEX 4 MG
15-30 LOZENGE BUCCAL
Status: DISCONTINUED | OUTPATIENT
Start: 2025-05-01 | End: 2025-05-01 | Stop reason: HOSPADM

## 2025-05-01 RX ORDER — BLOOD PRESSURE TEST KIT
KIT MISCELLANEOUS
Qty: 200 EACH | Refills: 1 | Status: SHIPPED | OUTPATIENT
Start: 2025-05-01

## 2025-05-01 RX ORDER — DEXTROSE MONOHYDRATE 25 G/50ML
25-50 INJECTION, SOLUTION INTRAVENOUS
Status: DISCONTINUED | OUTPATIENT
Start: 2025-05-01 | End: 2025-05-01 | Stop reason: HOSPADM

## 2025-05-01 RX ORDER — ONDANSETRON 4 MG/1
4 TABLET, ORALLY DISINTEGRATING ORAL EVERY 6 HOURS PRN
Status: DISCONTINUED | OUTPATIENT
Start: 2025-05-01 | End: 2025-05-01 | Stop reason: HOSPADM

## 2025-05-01 RX ORDER — DEXTROSE MONOHYDRATE AND SODIUM CHLORIDE 5; .45 G/100ML; G/100ML
INJECTION, SOLUTION INTRAVENOUS CONTINUOUS
Status: DISCONTINUED | OUTPATIENT
Start: 2025-05-01 | End: 2025-05-01

## 2025-05-01 RX ORDER — ONDANSETRON 2 MG/ML
4 INJECTION INTRAMUSCULAR; INTRAVENOUS EVERY 6 HOURS PRN
Status: DISCONTINUED | OUTPATIENT
Start: 2025-05-01 | End: 2025-05-01 | Stop reason: HOSPADM

## 2025-05-01 RX ORDER — POTASSIUM CHLORIDE 1500 MG/1
40 TABLET, EXTENDED RELEASE ORAL ONCE
Status: COMPLETED | OUTPATIENT
Start: 2025-05-01 | End: 2025-05-01

## 2025-05-01 RX ORDER — SODIUM CHLORIDE 450 MG/100ML
INJECTION, SOLUTION INTRAVENOUS CONTINUOUS
Status: DISCONTINUED | OUTPATIENT
Start: 2025-05-01 | End: 2025-05-01

## 2025-05-01 RX ORDER — ALBUTEROL SULFATE 5 MG/ML
2.5 SOLUTION RESPIRATORY (INHALATION) EVERY 4 HOURS PRN
Status: DISCONTINUED | OUTPATIENT
Start: 2025-05-01 | End: 2025-05-01

## 2025-05-01 RX ORDER — CONTAINER,EMPTY
EACH MISCELLANEOUS
Qty: 1 EACH | Refills: 1 | Status: SHIPPED | OUTPATIENT
Start: 2025-05-01

## 2025-05-01 RX ORDER — ACETAMINOPHEN 650 MG/1
650 SUPPOSITORY RECTAL EVERY 4 HOURS PRN
Status: DISCONTINUED | OUTPATIENT
Start: 2025-05-01 | End: 2025-05-01 | Stop reason: HOSPADM

## 2025-05-01 RX ORDER — ACETAMINOPHEN 325 MG/1
650 TABLET ORAL EVERY 4 HOURS PRN
Status: DISCONTINUED | OUTPATIENT
Start: 2025-05-01 | End: 2025-05-01 | Stop reason: HOSPADM

## 2025-05-01 RX ORDER — ALBUTEROL SULFATE 0.83 MG/ML
2.5 SOLUTION RESPIRATORY (INHALATION)
Status: DISCONTINUED | OUTPATIENT
Start: 2025-05-01 | End: 2025-05-01

## 2025-05-01 RX ORDER — LIDOCAINE 40 MG/G
CREAM TOPICAL
Status: DISCONTINUED | OUTPATIENT
Start: 2025-05-01 | End: 2025-05-01 | Stop reason: HOSPADM

## 2025-05-01 RX ORDER — ALBUTEROL SULFATE 90 UG/1
2 INHALANT RESPIRATORY (INHALATION) EVERY 6 HOURS PRN
Status: DISCONTINUED | OUTPATIENT
Start: 2025-05-01 | End: 2025-05-01 | Stop reason: HOSPADM

## 2025-05-01 RX ADMIN — DEXTROSE AND SODIUM CHLORIDE: 5; .45 INJECTION, SOLUTION INTRAVENOUS at 04:09

## 2025-05-01 RX ADMIN — INSULIN HUMAN 5.5 UNITS/HR: 1 INJECTION, SOLUTION INTRAVENOUS at 01:28

## 2025-05-01 RX ADMIN — INSULIN ASPART 2 UNITS: 100 INJECTION, SOLUTION INTRAVENOUS; SUBCUTANEOUS at 08:58

## 2025-05-01 RX ADMIN — INSULIN GLARGINE 20 UNITS: 100 INJECTION, SOLUTION SUBCUTANEOUS at 04:36

## 2025-05-01 RX ADMIN — SODIUM CHLORIDE 1000 ML: 0.9 INJECTION, SOLUTION INTRAVENOUS at 01:32

## 2025-05-01 RX ADMIN — POTASSIUM CHLORIDE 40 MEQ: 1500 TABLET, EXTENDED RELEASE ORAL at 04:35

## 2025-05-01 RX ADMIN — INSULIN HUMAN 5.5 UNITS/HR: 1 INJECTION, SOLUTION INTRAVENOUS at 00:18

## 2025-05-01 RX ADMIN — SODIUM CHLORIDE: 0.45 INJECTION, SOLUTION INTRAVENOUS at 03:16

## 2025-05-01 RX ADMIN — SODIUM CHLORIDE 1000 ML: 9 INJECTION, SOLUTION INTRAVENOUS at 00:22

## 2025-05-01 RX ADMIN — ENOXAPARIN SODIUM 40 MG: 40 INJECTION SUBCUTANEOUS at 04:35

## 2025-05-01 RX ADMIN — METFORMIN HYDROCHLORIDE 500 MG: 500 TABLET, FILM COATED ORAL at 09:00

## 2025-05-01 ASSESSMENT — ACTIVITIES OF DAILY LIVING (ADL)
ADLS_ACUITY_SCORE: 41
ADLS_ACUITY_SCORE: 41
ADLS_ACUITY_SCORE: 22
ADLS_ACUITY_SCORE: 22
ADLS_ACUITY_SCORE: 18
ADLS_ACUITY_SCORE: 22
ADLS_ACUITY_SCORE: 18
ADLS_ACUITY_SCORE: 22
ADLS_ACUITY_SCORE: 41
ADLS_ACUITY_SCORE: 22

## 2025-05-01 NOTE — ED NOTES
Pt complaining of dizziness, blurry vision, overall generalized weakness. His left eye has been red for a week. He admits to eating lots of chocolate and cookies whenever he can. He stopped taking his Metformin in October without notifying his provider.

## 2025-05-01 NOTE — ED TRIAGE NOTES
Pt comes in for generalized weakness for about a week now. He just got back from [facility] on April 12th. He said he started feeling this shortly after his trip. Denies any pain. Also endorsing feeling of SOB lately. Uses his inhaler with minimal relief. Hx of asthma.      Triage Assessment (Adult)       Row Name 04/30/25 1902          Triage Assessment    Airway WDL WDL        Respiratory WDL    Respiratory WDL X;rhythm/pattern     Rhythm/Pattern, Respiratory shortness of breath        Skin Circulation/Temperature WDL    Skin Circulation/Temperature WDL WDL        Cardiac WDL    Cardiac WDL X;rhythm     Pulse Rate & Regularity tachycardic        Peripheral/Neurovascular WDL    Peripheral Neurovascular WDL WDL        Cognitive/Neuro/Behavioral WDL    Cognitive/Neuro/Behavioral WDL WDL

## 2025-05-01 NOTE — PLAN OF CARE
Patient discharged home with insulin and metformin. Educated on sliding scale as able, educated on monitoring blood sugars and diet control. Provided multiple education pieces on print out for blood sugar monitoring and how to administer insulin. 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 Unable to get his glucometer picked up and brought to the hospital for testing. Educated on the importance of taking all antidiabetic medications and injectable insulin. Provided sliding scale and explained its application. Patient reports understanding of new medications. Walked to the elevator and drove himself home. Paper copies of prescriptions sent with the patient.

## 2025-05-01 NOTE — DISCHARGE SUMMARY
"[facility]  Hospitalist Discharge Summary      Date of Admission:  4/30/2025  Date of Discharge:  5/1/2025  Discharging Provider: Salazar Vazquez DO  Discharge Service: Hospitalist Service    Discharge Diagnoses   Hyperosmolar hyperglycemic state  Diabetes mellitus type 2 with hyperglycemia, without long term insulin use.   Anion gap metabolic acidosis  Mild persistent asthma  Essential hypertension    Clinically Significant Risk Factors     # DMII: A1C = 11.4 % (Ref range: <5.7 %) within past 6 months    # Obesity: Estimated body mass index is 32.5 kg/m  as calculated from the following:    Height as of this encounter: 1.702 m (5' 7\").    Weight as of this encounter: 94.1 kg (207 lb 8 oz).       Follow-ups Needed After Discharge   Follow-up Appointments       Hospital to Primary Care - Establish PCP Referral      Please be aware that coverage of these services is subject to the terms and limitations of your health insurance plan.  Call member services at your health plan with any benefit or coverage questions.    Schedule Primary Care visit within: 7 Days   Recommended labs and Imaging (to be ordered by Primary Care Provider): BMP.  TSH in 6 to 8 weeks.  Review home blood sugar readings, new diabetes mellitus type 2 diagnosis.             Unresulted Labs Ordered in the Past 30 Days of this Admission       Date and Time Order Name Status Description    5/1/2025  2:08 AM Osmolality In process     5/1/2025 12:51 AM Osmolality In process         These results will be followed up by [facility]    Discharge Disposition   Discharged to home  Condition at discharge: Stable    Hospital Course   Emmett Mora is a 54 year old male with history of prediabetes, who presented with dizziness, generalized weakness and shortness of breath.  Patient was previously diagnosed with prediabetes and was prescribed metformin which she stopped taking in October 2024.  Patient's initial lab findings were remarkable " for normal pH on VBG.  Glucose was elevated at 847 mg/dL.  Hemoglobin A1c was 11.4.  Ketones elevated at 2.06.  TSH was suppressed at 0.26.  Creatinine was mildly elevated from baseline at 1.2 baseline 1.0.  Patient had low anion gap metabolic acidosis.  Patient was treated with IV regular insulin infusion and normal saline fluid boluses with improvement of symptoms and laboratory abnormalities.  Patient was feeling well on 5//25.  Transition to insulin glargine correction scale insulin and metformin was resumed.  Patient tolerated diet.  Received diabetic education by nursing staff and was discharged home with instructions to follow-up with primary care.    Consultations This Hospital Stay   None    Code Status   Full Code    Time Spent on this Encounter   I, Salazar Vazquez DO, personally saw the patient today and spent greater than 30 minutes discharging this patient.       Salazar Vazquez DO  [facility] 3 ICU  [address] [address]  Phone: [phone]  Fax: [phone]  ______________________________________________________________________    Physical Exam   Vital Signs: Temp: 97.9  F (36.6  C) Temp src: Oral BP: (!) 144/80 Pulse: 87   Resp: 20 SpO2: 96 % O2 Device: None (Room air)    Weight: 207 lbs 8 oz  General Appearance: NAD.  Respiratory: CTAB no wheezing, nonlabored breathing.   Cardiovascular: RRR, no murmur or gallop.   GI: Nondistended, normoactive bowel sounds, nontender to palpation. No guarding.   Skin: warm and dry, no exanthem on exposed skin.   Other: Aox3, normal mood and affect.           Primary Care Physician   Physician No Ref-Primary    Discharge Orders      Hospital to Primary Care - Establish PCP Referral      Reason for your hospital stay    Uncontrolled diabetes mellitus type 2, HHS, and anion gap metabolic acidosis.     Activity    Your activity upon discharge: activity as tolerated     Monitor and record    Blood glucose: 4  times a day, before meals and at bedtime and amount of insulin given.     When to contact your care team    Call your primary doctor if you have any of the following: two blood sugar readings greater than 400 mg/dL.     Diet    Follow this diet upon discharge: Current Diet:Orders Placed This Encounter      Regular diet.       Significant Results and Procedures   Most Recent 3 CBC's:  Recent Labs   Lab Test 04/30/25 2232 08/28/23  1842 05/14/23  0202   WBC 8.6 9.8 7.9   HGB 13.3 12.3* 12.5*   MCV 82 84 86    467* 314     Most Recent 3 BMP's:  Recent Labs   Lab Test 05/01/25  0804 05/01/25  0749 05/01/25  0550 05/01/25  0549 05/01/25  0400 05/01/25  0340 05/01/25  0130 05/01/25  0023   NA  --   --  138  --   --  138  --  132*   POTASSIUM  --  3.7 3.6  --   --  3.3*  --  4.4   CHLORIDE  --   --  104  --   --  105  --  98   CO2  --   --  21*  --   --  21*  --  18*   BUN  --   --  15.8  --   --  17.9  --  21.6*   CR  --   --  0.97  --   --  1.00  --  1.01   ANIONGAP  --   --  13  --   --  12  --  16*   PHILIP  --   --  8.7*  --   --  8.6*  --  8.2*   *  --  158* 158*   < > 234*   < > 618*    < > = values in this interval not displayed.     Most Recent 2 LFT's:  Recent Labs   Lab Test 04/30/25 2232 08/28/23  1842   AST 23 22   ALT 46 19   ALKPHOS 185* 77   BILITOTAL 0.3 0.4     Most Recent 3 BNP's:  Recent Labs   Lab Test 04/30/25 2232 05/14/23  0202   NTBNPI <36 <36     Most Recent TSH and T4:  Recent Labs   Lab Test 04/30/25 2232   TSH 0.26*   T4 1.30     Most Recent Urinalysis:  Recent Labs   Lab Test 04/30/25 2243   COLOR Colorless   APPEARANCE Clear   URINEGLC >1000*   URINEBILI Negative   URINEKETONE 10*   SG 1.025   UBLD Negative   URINEPH 5.0   PROTEIN Negative   NITRITE Negative   LEUKEST Negative   RBCU <1   WBCU <1     Most Recent ABG:No lab results found.  Most Recent ESR & CRP:No lab results found.,   Results for orders placed or performed during the hospital encounter of 04/30/25   XR Chest  Port 1 View    Narrative    EXAM: XR CHEST PORT 1 VIEW  LOCATION: [facility]  DATE: 4/30/2025    INDICATION: dyspnea  COMPARISON: Chest x-ray August 28, 2023      Impression    IMPRESSION: Negative chest.       Discharge Medications   Current Discharge Medication List        START taking these medications    Details   Alcohol Swabs PADS Use to swab the area of the injection or calvin as directed   Per insurance coverage  Qty: 200 each, Refills: 1    Associated Diagnoses: Hyperglycemia due to diabetes mellitus (H)      blood glucose (NO BRAND SPECIFIED) lancets standard To use to test glucose level in the blood  Use to test blood sugar  4  times daily as directed. To accompany glucose monitor brands per insurance coverage.  Qty: 200 each, Refills: 1    Associated Diagnoses: Hyperglycemia due to diabetes mellitus (H)      blood glucose (NO BRAND SPECIFIED) test strip To use to test glucose level in the blood  Use to test blood sugar  4 times daily as directed. To accompany glucose monitor brands per insurance coverage.  Qty: 200 strip, Refills: 1    Associated Diagnoses: Hyperglycemia due to diabetes mellitus (H)      blood glucose monitoring (NO BRAND SPECIFIED) meter device kit Use as directed   Per insurance coverage  Qty: 1 kit, Refills: 0    Associated Diagnoses: Hyperglycemia due to diabetes mellitus (H)      glucose (BD GLUCOSE) 4 g chewable tablet Take 4 tablets by mouth every 15 minutes as needed for low blood sugar.  Qty: 50 tablet, Refills: 1    Associated Diagnoses: Hyperglycemia due to diabetes mellitus (H)      insulin aspart (NOVOLOG PEN) 100 UNIT/ML pen Correction Scale - MEDIUM INSULIN RESISTANCE DOSING     Do Not give Correction Insulin if Pre-Meal BG less than 140.   For Pre-Meal  - 189 give 1 unit.   For Pre-Meal  - 239 give 2 units.   For Pre-Meal  - 289 give 3 units.   For Pre-Meal  - 339 give 4 units.   For Pre-Meal - 389 give 5 units.   For  "Pre-Meal -439 give 6 units  For Pre-Meal BG greater than or equal to 440 give 7 units.   To be given with prandial insulin, and based on pre-meal blood glucose. Administering insulin within 5 minutes of the start of the meal is ideal. Administer insulin no more than 30 minutes after the start of the meal, unless directed otherwise by provider.     Notify provider if glucose greater than or equal to 350 mg/dL after administration of correction dose.  Qty: 15 mL, Refills: 0    Associated Diagnoses: Hyperglycemia due to diabetes mellitus (H)      insulin glargine (LANTUS PEN) 100 UNIT/ML pen Inject 20 Units subcutaneously every morning (before breakfast).  Qty: 15 mL, Refills: 0    Comments: If Lantus is not covered by insurance, may substitute Basaglar or Semglee or other insulin glargine product per insurance preference at same dose and frequency.    Associated Diagnoses: Hyperglycemia due to diabetes mellitus (H)      metFORMIN (GLUCOPHAGE) 500 MG tablet Take 1 tablet (500 mg) by mouth 2 times daily (with meals).  Qty: 60 tablet, Refills: 1    Associated Diagnoses: Hyperglycemia due to diabetes mellitus (H)      Sharps Container MISC Use as directed to dispose of needles, lancets and other sharps  Qty: 1 each, Refills: 1    Associated Diagnoses: Hyperglycemia due to diabetes mellitus (H)           CONTINUE these medications which have NOT CHANGED    Details   albuterol (PROAIR HFA/PROVENTIL HFA/VENTOLIN HFA) 108 (90 Base) MCG/ACT inhaler Inhale 2 puffs into the lungs every 6 hours as needed for shortness of breath or wheezing  Qty: 18 g, Refills: 0      NONFORMULARY Take 1 tablet by mouth See Admin Instructions. \"Bronchonol\" for asthma -- take as directed on 's packaging           Allergies   Allergies   Allergen Reactions    Montelukast Hives and Rash     Potential cause of hives/rash.     "

## 2025-05-01 NOTE — MEDICATION SCRIBE - ADMISSION MEDICATION HISTORY
"Medication Scribe Admission Medication History    Admission medication history is complete. The information provided in this note is only as accurate as the sources available at the time of the update.    Information Source(s): Patient, Clinic records, and [facility]    Pertinent Information: Patient reported having been on an antibiotic in [facility] which ended within the past month. Has used albuterol twice today PTA and took one tablet of an OTC medication for asthma/difficulty breathing called Bronchonol (could be \"Bronchoril\" or acebrophylline potentially). Unable to find the exact formulation of this medication with online resources. No other OTC products reported in the last several days.     Patient reported previously taking montelukast which may have been the cause of a rash/hives. Added to allergy list as a potential allergy.     Previously was taking metformin ER (last filled 9/2024), but reported not taking this at this time.     Has fills within the last year for gabapentin 300 mg (10/2024), montelukast 10 mg (7/2024, stopped), tizanidine 4 mg (10/2024). Not currently taking any of these at this time.     Changes made to PTA medication list:  Added:   Nonformulary bronchonol asthma aid  Deleted:   Prednisone 20 mg (completed, filled 10/2024)  Changed: None    Allergies reviewed with patient and updates made in EHR: yes    Medication History Completed By: Jl Issa 4/30/2025 10:42 PM    PTA Med List   Medication Sig Last Dose/Taking    albuterol (PROAIR HFA/PROVENTIL HFA/VENTOLIN HFA) 108 (90 Base) MCG/ACT inhaler Inhale 2 puffs into the lungs every 6 hours as needed for shortness of breath or wheezing 4/30/2025 Evening    NONFORMULARY Take 1 tablet by mouth See Admin Instructions. \"Bronchonol\" for asthma -- take as directed on 's packaging 4/30/2025 Morning       "

## 2025-05-01 NOTE — H&P
"[facility] MEDICINE ADMISSION HISTORY AND PHYSICAL       Assessment & Plan          Present on Admission (POA)    1. Appears to be HHS, than DKA, though some mild HAGMA acidosis noted. Has normal pH but initial glucose is 847. Corrected Na is 135, and K is 5.2 with mild acute renal failure     - He is preDM and has not been taking his metformin. He presented with weakness, thirsty, peeing lot, drinking a lot of water and blurriness of vision.   - With A1c of 11 - now has DM2  - Will use [facility] order set -- still looking dry and dehydrated. Given 2L of NSS so far   - BMP q3 with osmolality  - Insulin drip can be stopped if osmolality less than 320, and glucose of less than 300 --- can overlap with lantus   - neuro checks, NPO for now   - cardiac tele, [facility] admit    - He was told likely he will go home with DM meds this time     2. Asthma, mild exacerbation   - Not requiring O2, can talk full sentences, and denies SOB at this point. And he said, he feels better now. O2 sat is 97% on RA   - Low suspicion for PE  - Albuterol q8 and q2 PRN  - pulse ox   - No indication for systemic steroid       Chronic Medical Conditions    1. HTN   2. preDM  3. Asthma       310A - Calculated osmolality - 302 as of 1223A - BMP     4A -Calculated osmolality is 295, and Glucose is 234 - Start lantus 20 units now. And may stop insulin drip, DM diet       Clinically Significant Risk Factors Present on Admission         # Hyponatremia: Lowest Na = 123 mmol/L in last 2 days, will monitor as appropriate  # Hypochloremia: Lowest Cl = 86 mmol/L in last 2 days, will monitor as appropriate                    # Obesity: Estimated body mass index is 31.95 kg/m  as calculated from the following:    Height as of this encounter: 1.702 m (5' 7\").    Weight as of this encounter: 92.5 kg (204 lb).                        VTE prophylaxis --  SCDs, if appropriate, add SQH  Diet --  NPO  Code Status -- Full,  Barriers to discharge -- " "Admitting/Acute medical condition/s  Discharge Disposition and goals --  Unable to determine at this point, pending progress/treatment response.     PPE - I was wearing PPE including but not limited to - N95 mask, Gloves, and/or Safety glasses.  Admission Status --  Inpatient     Care plan is based on available information and patient's condition at encounter. Care plan was discussed and agreed to proceed by the patient/family member. Made aware that care plan may change.     I recommend to review/revise history/care plan for information/results not available during my encounter. All or some home medication/s were not resumed or was modified on admission due to safety concerns. Will have rounding AM doc  review safety to resume held/modified home medications.     Availability -- If need to coordinate care after night shift  -- Contact assigned AM rounding Hospitalist.     75 minutes spent by me on the date of service doing chart review, history, exam, diagnostic test results interpretation, care coordination with RN, RT and/or Pharm, & further activities per the note.      Deonte Quigley MD, MPH, FACP, [facility]  Internal Medicine - Hospitalist        Chief Complaint Weak      HISTORY     - Patient was seen in ED - 15  - Awake and interactive when I met him. Here for at least a week of weakness, tired, thirsty, peeing a lot. Some blurriness of vision but no visual loss. Has left lazy eye and \"not working anymore\".  - Has pre-DM and used to be on metformin - he stopped this last 10/24  - He just came back from [facility] for family matters last April 22, 2025  - Has asthma and intermittently having SOB - Denies SOB at this point and feels better. O2 sat is9 7% on RA. Denies SOB or cough. He works at the group home. No leg pains or swelling     - ED course/treatments/referral - has HAG but appears to be HHS. He was started on DKA/HHS protocol. Glucose is 847     - ROS --- No CP or SOB. No palpitations. No abdominal pain. No " nausea or vomiting. No urinary symptoms. No bleeding symptoms. No weight loss. Rest of 12 point ROS was reviewed and negative.       Past Medical History     No past medical history on file.      Surgical History     No past surgical history on file.     Family History      No family history on file.      Social History      .  Social History     Socioeconomic History    Marital status: Single     Spouse name: Not on file    Number of children: Not on file    Years of education: Not on file    Highest education level: Not on file   Occupational History    Not on file   Tobacco Use    Smoking status: Not on file    Smokeless tobacco: Not on file   Substance and Sexual Activity    Alcohol use: Not on file    Drug use: Not on file    Sexual activity: Not on file   Other Topics Concern    Not on file   Social History Narrative    Not on file     Social Drivers of Health     Financial Resource Strain: Low Risk  (9/24/2024)    Received from [facility]    Financial Resource Strain     Difficulty of Paying Living Expenses: 3     Difficulty of Paying Living Expenses: Not on file   Food Insecurity: No Food Insecurity (9/24/2024)    [facility]    Food Insecurity     Do you worry your food will run out before you are able to buy more?: 1   Transportation Needs: No Transportation Needs (9/24/2024)    Received from [facility]    Transportation Needs     Does lack of transportation keep you from medical appointments?: 1     Does lack of transportation keep you from work, meetings or getting things that you need?: 1   Physical Activity: Not on file   Stress: Not on file   Social Connections: Socially Integrated (9/24/2024)    Received from [facility]    Social Connections     Do you often feel lonely or isolated from those around you?: 0   Interpersonal Safety: Not on file   Housing  "Stability: Low Risk  (9/24/2024)    Received from [facility]    Housing Stability     What is your housing situation today?: 1          Allergies        Allergies   Allergen Reactions    Montelukast Hives and Rash     Potential cause of hives/rash.         Prior to Admission Medications      Current Facility-Administered Medications   Medication Dose Route Frequency Provider Last Rate Last Admin    [START ON 5/1/2025] insulin aspart (NovoLOG) injection (RAPID ACTING)  10 Units Subcutaneous Once Vijay Whitney MD        metFORMIN (GLUCOPHAGE XR) 24 hr tablet 1,000 mg  1,000 mg Oral Once Vijay Whitney MD        ondansetron (ZOFRAN) injection 4 mg  4 mg Intravenous Q30 Min PRN Vijay Whitney MD        sodium chloride 0.9% BOLUS 1,000 mL  1,000 mL Intravenous Once Vijay Whitney MD 1,000 mL/hr at 04/30/25 2246 1,000 mL at 04/30/25 2246     Current Outpatient Medications   Medication Sig Dispense Refill    albuterol (PROAIR HFA/PROVENTIL HFA/VENTOLIN HFA) 108 (90 Base) MCG/ACT inhaler Inhale 2 puffs into the lungs every 6 hours as needed for shortness of breath or wheezing 18 g 0    NONFORMULARY Take 1 tablet by mouth See Admin Instructions. \"Bronchonol\" for asthma -- take as directed on 's packaging             Review of Systems     A 12 point comprehensive review of systems was negative except as noted above in HPI.    PHYSICAL EXAMINATION       Vitals      Vitals: BP (!) 183/87   Pulse 104   Temp 97.1  F (36.2  C) (Oral)   Resp 24   Ht 1.702 m (5' 7\")   Wt 92.5 kg (204 lb)   SpO2 98%   BMI 31.95 kg/m    BMI= Body mass index is 31.95 kg/m .      Examination     General Appearance:  Alert, cooperative, no distress  Head:    Normocephalic, without obvious abnormality, atraumatic - dry mouth and tongue   EENT:  PERRL, conjunctiva/corneas clear, EOM's intact.   Neck:   Supple, symmetrical, trachea midline, no " adenopathy; no NVE  Back:  Symmetric, no curvature, no CVA tenderness  Chest/Lungs: decreased air entry, very mild wheezing, respirations unlabored, No tenderness or deformity. No abdominal breathing or use of accessory muscles.   Heart:    Regular rate and rhythm, S1 and S2 normal, no murmur, rub   or gallop  Abdomen: Soft, non-tender, bowel sounds active all four quadrants, not peritoneal on palpation. Not distended  Extremities:  Extremities normal, atraumatic, no swelling   Skin:  Skin color, texture, turgor normal, no rashes or lesion  Neurologic:  Awake and alert,  No lateralizing or localizing signs       Pertinent Lab     Results for orders placed or performed during the hospital encounter of 04/30/25   XR Chest Port 1 View    Impression    IMPRESSION: Negative chest.   Comprehensive metabolic panel   Result Value Ref Range    Sodium 123 (L) 135 - 145 mmol/L    Potassium 5.2 3.4 - 5.3 mmol/L    Carbon Dioxide (CO2) 20 (L) 22 - 29 mmol/L    Anion Gap 17 (H) 7 - 15 mmol/L    Urea Nitrogen 25.3 (H) 6.0 - 20.0 mg/dL    Creatinine 1.20 (H) 0.67 - 1.17 mg/dL    GFR Estimate 71 >60 mL/min/1.73m2    Calcium 9.9 8.8 - 10.4 mg/dL    Chloride 86 (L) 98 - 107 mmol/L    Glucose 847 (HH) 70 - 99 mg/dL    Alkaline Phosphatase 185 (H) 40 - 150 U/L    AST 23 0 - 45 U/L    ALT 46 0 - 70 U/L    Protein Total 6.9 6.4 - 8.3 g/dL    Albumin 4.4 3.5 - 5.2 g/dL    Bilirubin Total 0.3 <=1.2 mg/dL   Result Value Ref Range    Troponin T, High Sensitivity 16 <=22 ng/L   Result Value Ref Range    Magnesium 2.5 (H) 1.7 - 2.3 mg/dL   TSH with free T4 reflex   Result Value Ref Range    TSH 0.26 (L) 0.30 - 4.20 uIU/mL   Blood gas venous   Result Value Ref Range    pH Venous 7.39 7.32 - 7.43    pCO2 Venous 38 (L) 40 - 50 mm Hg    pO2 Venous 73 (H) 25 - 47 mm Hg    Bicarbonate Venous 23 21 - 28 mmol/L    Base Excess/Deficit Venous -1.4 -3.0 - 3.0 mmol/L    FIO2 21     Oxyhemoglobin Venous 89 (H) 70 - 75 %    O2 Sat, Venous 95.0 (H) 70.0 -  75.0 %   Lactic acid whole blood with 1x repeat in 2 hr when >2   Result Value Ref Range    Lactic Acid, Initial 1.4 0.7 - 2.0 mmol/L   UA with Microscopic reflex to Culture    Specimen: Urine, Midstream   Result Value Ref Range    Color Urine Colorless Colorless, Straw, Light Yellow, Yellow    Appearance Urine Clear Clear    Glucose Urine >1000 (A) Negative mg/dL    Bilirubin Urine Negative Negative    Ketones Urine 10 (A) Negative mg/dL    Specific Gravity Urine 1.025 1.001 - 1.030    Blood Urine Negative Negative    pH Urine 5.0 5.0 - 7.0    Protein Albumin Urine Negative Negative mg/dL    Urobilinogen Urine Normal Normal mg/dL    Nitrite Urine Negative Negative    Leukocyte Esterase Urine Negative Negative    Mucus Urine Present (A) None Seen /LPF    RBC Urine <1 <=2 /HPF    WBC Urine <1 <=5 /HPF    Squamous Epithelials Urine <1 <=1 /HPF   Ketone Beta-Hydroxybutyrate Quantitative   Result Value Ref Range    Ketone (Beta-Hydroxybutyrate) Quantitative 2.06 (HH) <=0.30 mmol/L   Glucose by meter   Result Value Ref Range    GLUCOSE BY METER POCT >600 (HH) 70 - 99 mg/dL   CBC with platelets and differential   Result Value Ref Range    WBC Count 8.6 4.0 - 11.0 10e3/uL    RBC Count 4.33 (L) 4.40 - 5.90 10e6/uL    Hemoglobin 13.3 13.3 - 17.7 g/dL    Hematocrit 35.7 (L) 40.0 - 53.0 %    MCV 82 78 - 100 fL    MCH 30.7 26.5 - 33.0 pg    MCHC 37.3 (H) 31.5 - 36.5 g/dL    RDW 13.9 10.0 - 15.0 %    Platelet Count 273 150 - 450 10e3/uL    % Neutrophils 69 %    % Lymphocytes 23 %    % Monocytes 7 %    % Eosinophils 0 %    % Basophils 0 %    % Immature Granulocytes 1 %    NRBCs per 100 WBC 0 <1 /100    Absolute Neutrophils 5.9 1.6 - 8.3 10e3/uL    Absolute Lymphocytes 2.0 0.8 - 5.3 10e3/uL    Absolute Monocytes 0.6 0.0 - 1.3 10e3/uL    Absolute Eosinophils 0.0 0.0 - 0.7 10e3/uL    Absolute Basophils 0.0 0.0 - 0.2 10e3/uL    Absolute Immature Granulocytes 0.1 <=0.4 10e3/uL    Absolute NRBCs 0.0 10e3/uL           Pertinent  Radiology

## 2025-05-01 NOTE — ED PROVIDER NOTES
NAME: Emmett Mora  AGE: 54 year old male  YOB: 1970  MRN: [id number]  EVALUATION DATE & TIME: 4/30/2025 10:07 PM    PCP: No Ref-Primary, Physician    ED PROVIDER: Vijay Whitney M.D.      Chief Complaint   Patient presents with    Generalized Weakness    Shortness of Breath     FINAL IMPRESSION:  1. Hyperglycemia due to diabetes mellitus (H)    2. Dehydration    3. Dyspnea, unspecified type    4. Mild persistent asthma with acute exacerbation    5. Diabetic ketoacidosis without coma associated with type 2 diabetes mellitus (H)      MEDICAL DECISION MAKING:    10:23 PM I met with the patient, obtained history, performed an initial exam, and discussed options and plan for diagnostics and treatment here in the ED.   11:16 PM patient's chest x-ray returned and preliminary read does show some restriction and decreased lung volumes, no obvious consolidation on my independent evaluation.  11:43 PM I spoke with Dr. Quigley with the hospitalist service who agrees to admit the patient.      Patient was clinically assessed and consented to treatment. After assessment, medical decision making and workup were discussed with the patient. The patient was agreeable to plan for testing, workup, and treatment.  Pertinent Labs & Imaging studies reviewed. (See chart for details)       Medical Decision Making  I reviewed the EMR: Outpatient Record: Outpatient primary care visits and medication list from March 7 of this year.  Care impacted by Chronic Lung Disease and Diabetes  I independently interpreted the EKG and note no acute ischemia. See radiology report for final interpretation.  I discussed the care with another health care provider: Hospitalist  Admit.    MIPS (CTPE, Dental pain, Dodd, Sinusitis, Asthma/COPD, Head Trauma): Not Applicable    SEPSIS: None    Emmett Mora is a 54 year old male who presents with generalized weakness and shortness of breath.   Differential diagnosis includes but not  limited to asthma exacerbation, COPD exacerbation, pneumonia, diabetic ketoacidosis, hyperglycemia, dehydration, acute kidney injury, metabolic imbalance.  Patient is a 54-year-old male who diagnosed with prediabetes of supposed be on metformin.  He also has history of asthma and does complain of shortness of breath.  Patient does have diminished breath sounds bilaterally likely asthma exacerbation but also could be related to DKA.  Patient not in acute respiratory distress but does have the diminished sounds bilaterally.  IV was established and labs were sent.  Patient was given IV fluids for the elevated blood sugar from triage.  Blood sugar on IV labs showed 843 and consistent with hyperglycemia but VBG showed no acidosis however he did have a slight gap acidosis on his metabolic panel.  Patient is borderline for DKA but after fluids we will plan to recheck sugar.  Additionally his CBC was stable, lactic acid was normal, and ketones slightly elevated.  Findings on borderline DKA were discussed with hospitalist as patient will require admission for the blood sugar control.  Additionally he does have diminished lung sounds and chest x-ray was checked but did not show any acute pneumonia.  We did try DuoNeb and his breathing did improve with significant increase in ventilation at the bases.  I do feel patient has some component of asthma exacerbation and interstitial inflammation from this contributing to shortness of breath in addition to probably mild DKA.  Patient discussed with hospitalist and initially reviewed the considered subcutaneous insulin given the borderline nature however given his sugar elevation as well as possible need for steroids for the lung inflammation patient was started on DKA protocol with insulin drip.  This was started and we will continue to monitor and manage sugars.  Patient agreeable to admission from the hospital and no antibiotics ordered at this time after negative infectious  workup.      Critical Care     Performed by: Dr Saúl Whitney  Authorized by: Dr Saúl Whitney  Total critical care time: 45 minutes  Critical care was necessary to treat or prevent imminent or life-threatening deterioration of the following conditions: DKA, metabolic acidosis, asthma exacerbation  Critical care was time spent personally by me on the following activities: development of treatment plan with patient or surrogate, discussions with consultants, examination of patient, evaluation of patient's response to treatment, obtaining history from patient or surrogate, ordering and performing treatments and interventions, ordering and review of laboratory studies, ordering and review of radiographic studies, re-evaluation of patient's condition and monitoring for potential decompensation.  Critical care time was exclusive of separately billable procedures and treating other patients.    MEDICATIONS GIVEN IN THE EMERGENCY:  Medications   ondansetron (ZOFRAN) injection 4 mg (has no administration in time range)   insulin regular (MYXREDLIN) 1 unit/mL infusion (5 Units/hr Intravenous Rate/Dose Change 5/1/25 0210)   glucose gel 15-30 g (has no administration in time range)     Or   dextrose 50 % injection 25-50 mL (has no administration in time range)     Or   glucagon injection 1 mg (has no administration in time range)   0.45% sodium chloride infusion (has no administration in time range)   dextrose 5% and 0.45% NaCl infusion (has no administration in time range)   albuterol (PROVENTIL) neb solution 2.5 mg (has no administration in time range)   lidocaine 1 % 0.1-1 mL (has no administration in time range)   lidocaine (LMX4) cream (has no administration in time range)   sodium chloride (PF) 0.9% PF flush 3 mL (has no administration in time range)   sodium chloride (PF) 0.9% PF flush 3 mL (has no administration in time range)   senna-docusate (SENOKOT-S/PERICOLACE) 8.6-50 MG per tablet 1 tablet (has no administration in  time range)     Or   senna-docusate (SENOKOT-S/PERICOLACE) 8.6-50 MG per tablet 2 tablet (has no administration in time range)   calcium carbonate (TUMS) chewable tablet 1,000 mg (has no administration in time range)   acetaminophen (TYLENOL) tablet 650 mg (has no administration in time range)     Or   acetaminophen (TYLENOL) Suppository 650 mg (has no administration in time range)   ondansetron (ZOFRAN ODT) ODT tab 4 mg (has no administration in time range)     Or   ondansetron (ZOFRAN) injection 4 mg (has no administration in time range)   prochlorperazine (COMPAZINE) injection 10 mg (has no administration in time range)     Or   prochlorperazine (COMPAZINE) tablet 10 mg (has no administration in time range)   enoxaparin ANTICOAGULANT (LOVENOX) injection 40 mg (has no administration in time range)   albuterol (PROVENTIL) neb solution 2.5 mg (2.5 mg Nebulization Not Given 5/1/25 0157)   sodium chloride 0.9% BOLUS 1,000 mL (0 mLs Intravenous Stopped 5/1/25 0033)   ipratropium - albuterol 0.5 mg/2.5 mg/3 mL (DUONEB) neb solution 3 mL (3 mLs Nebulization $Given 4/30/25 2324)   sodium chloride 0.9% BOLUS 1,000 mL (0 mLs Intravenous Stopped 5/1/25 0136)   sodium chloride 0.9% BOLUS 1,000 mL (1,000 mLs Intravenous $New Bag 5/1/25 0132)       NEW PRESCRIPTIONS STARTED AT TODAY'S ER VISIT:  New Prescriptions    No medications on file          =================================================================    HPI    Patient information was obtained from: Patient    Use of : N/A         Emmett Mora is a 54 year old male with a past medical history of prediabetes and asthma, who presents with dizziness and fatigue. The patient reports here with one week of fatigue, dizziness, and a dry mouth.  He also reports polydipsia and developing shortness of breath today.  He has a history of asthma as well as prediabetes.  He is not currently on any diabetes medication.  He does report taking a small tablet for his  asthma daily but cannot recall what it was.  He supposed be on diabetes medication but he cannot recall what that was but it is only once a day.  He denies any abdominal pain, nausea, or other complaints at this time.  Patient denies any trauma, no headache, no blurry vision or tingling or numbness in his extremities.      REVIEW OF SYSTEMS   Review of Systems   Constitutional:  Positive for fatigue.   HENT:          Positive for dry mouth   Gastrointestinal:  Negative for abdominal pain and nausea.   Endocrine: Positive for polydipsia.   Neurological:  Positive for dizziness.   All other systems reviewed and are negative.     PAST MEDICAL HISTORY:  No past medical history on file.    PAST SURGICAL HISTORY:  No past surgical history on file.    CURRENT MEDICATIONS:      Current Facility-Administered Medications:     0.45% sodium chloride infusion, , Intravenous, Continuous, Susan Quigley MD    acetaminophen (TYLENOL) tablet 650 mg, 650 mg, Oral, Q4H PRN **OR** acetaminophen (TYLENOL) Suppository 650 mg, 650 mg, Rectal, Q4H PRN, Susan Quigley MD    albuterol (PROVENTIL) neb solution 2.5 mg, 2.5 mg, Nebulization, Q4H PRN, Susan Quigley MD    albuterol (PROVENTIL) neb solution 2.5 mg, 2.5 mg, Nebulization, Q8H, Susan Quigley MD    calcium carbonate (TUMS) chewable tablet 1,000 mg, 1,000 mg, Oral, 4x Daily PRN, Susan Quigley MD    dextrose 5% and 0.45% NaCl infusion, , Intravenous, Continuous, Susan Quigley MD    glucose gel 15-30 g, 15-30 g, Oral, Q15 Min PRN **OR** dextrose 50 % injection 25-50 mL, 25-50 mL, Intravenous, Q15 Min PRN **OR** glucagon injection 1 mg, 1 mg, Subcutaneous, Q15 Min PRN, Susan Quigley MD    enoxaparin ANTICOAGULANT (LOVENOX) injection 40 mg, 40 mg, Subcutaneous, Q24H, Susan Quigley MD    insulin regular (MYXREDLIN) 1 unit/mL infusion, , Intravenous, Continuous, Susan Quigley MD, Last Rate: 5 mL/hr at 05/01/25 0210, 5 Units/hr at 05/01/25 0210     "lidocaine (LMX4) cream, , Topical, Q1H PRN, Susan Quigley MD    lidocaine 1 % 0.1-1 mL, 0.1-1 mL, Other, Q1H PRN, Susan Quigley MD    ondansetron (ZOFRAN ODT) ODT tab 4 mg, 4 mg, Oral, Q6H PRN **OR** ondansetron (ZOFRAN) injection 4 mg, 4 mg, Intravenous, Q6H PRN, Susan Quigley MD    ondansetron (ZOFRAN) injection 4 mg, 4 mg, Intravenous, Q30 Min PRN, Vijay Whitney MD    prochlorperazine (COMPAZINE) injection 10 mg, 10 mg, Intravenous, Q6H PRN **OR** prochlorperazine (COMPAZINE) tablet 10 mg, 10 mg, Oral, Q6H PRN, Susan Quigley MD    senna-docusate (SENOKOT-S/PERICOLACE) 8.6-50 MG per tablet 1 tablet, 1 tablet, Oral, BID PRN **OR** senna-docusate (SENOKOT-S/PERICOLACE) 8.6-50 MG per tablet 2 tablet, 2 tablet, Oral, BID PRN, Susan Quigley MD    sodium chloride (PF) 0.9% PF flush 3 mL, 3 mL, Intracatheter, Q8H XIOMARA, Susan Quigley MD    sodium chloride (PF) 0.9% PF flush 3 mL, 3 mL, Intracatheter, q1 min prn, Susan Quigley MD    Current Outpatient Medications:     albuterol (PROAIR HFA/PROVENTIL HFA/VENTOLIN HFA) 108 (90 Base) MCG/ACT inhaler, Inhale 2 puffs into the lungs every 6 hours as needed for shortness of breath or wheezing, Disp: 18 g, Rfl: 0    NONFORMULARY, Take 1 tablet by mouth See Admin Instructions. \"Bronchonol\" for asthma -- take as directed on 's packaging, Disp: , Rfl:     ALLERGIES:  Allergies   Allergen Reactions    Montelukast Hives and Rash     Potential cause of hives/rash.       FAMILY HISTORY:  No family history on file.    SOCIAL HISTORY:   Social History     Socioeconomic History    Marital status: Single     Social Drivers of Health     Financial Resource Strain: Low Risk  (9/24/2024)    Received from [facility]    Financial Resource Strain     Difficulty of Paying Living Expenses: 3   Food Insecurity: No Food Insecurity (9/24/2024)    Received from [facility]    " "Food Insecurity     Do you worry your food will run out before you are able to buy more?: 1   Transportation Needs: No Transportation Needs (9/24/2024)    Received from [employer] [facility] [employer] [facility]    Transportation Needs     Does lack of transportation keep you from medical appointments?: 1     Does lack of transportation keep you from work, meetings or getting things that you need?: 1   Social Connections: Socially Integrated (9/24/2024)    Received from [facility] [employer] [facility]    Social Connections     Do you often feel lonely or isolated from those around you?: 0   Housing Stability: Low Risk  (9/24/2024)    Received from [facility] [employer] [facility]    Housing Stability     What is your housing situation today?: 1       PHYSICAL EXAM:    Vitals: BP (!) 146/80   Pulse 94   Temp 97.1  F (36.2  C) (Oral)   Resp 12   Ht 1.702 m (5' 7\")   Wt 92.5 kg (204 lb)   SpO2 96%   BMI 31.95 kg/m     Physical Exam  Vitals and nursing note reviewed.   Constitutional:       General: He is not in acute distress.     Appearance: He is well-developed and normal weight. He is not ill-appearing or toxic-appearing.   HENT:      Head: Normocephalic.   Neck:      Vascular: No JVD.   Cardiovascular:      Rate and Rhythm: Normal rate and regular rhythm.      Heart sounds: Normal heart sounds.   Pulmonary:      Effort: No tachypnea, accessory muscle usage or respiratory distress.      Breath sounds: Examination of the right-lower field reveals decreased breath sounds. Examination of the left-lower field reveals decreased breath sounds. Decreased breath sounds present. No wheezing, rhonchi or rales.   Chest:      Chest wall: No tenderness.   Abdominal:      Palpations: Abdomen is soft.      Tenderness: There is no abdominal tenderness.   Musculoskeletal:      Cervical back: Normal range of motion and neck supple.      Right lower leg: No tenderness. No edema.      Left lower leg: " No tenderness. No edema.   Skin:     General: Skin is warm and dry.      Coloration: Skin is not cyanotic or pale.   Neurological:      General: No focal deficit present.      Mental Status: He is alert.   Psychiatric:         Behavior: Behavior normal.           LAB:  All pertinent labs reviewed and interpreted.  Labs Ordered and Resulted from Time of ED Arrival to Time of ED Departure   COMPREHENSIVE METABOLIC PANEL - Abnormal       Result Value    Sodium 123 (*)     Potassium 5.2      Carbon Dioxide (CO2) 20 (*)     Anion Gap 17 (*)     Urea Nitrogen 25.3 (*)     Creatinine 1.20 (*)     GFR Estimate 71      Calcium 9.9      Chloride 86 (*)     Glucose 847 (*)     Alkaline Phosphatase 185 (*)     AST 23      ALT 46      Protein Total 6.9      Albumin 4.4      Bilirubin Total 0.3     MAGNESIUM - Abnormal    Magnesium 2.5 (*)    TSH WITH FREE T4 REFLEX - Abnormal    TSH 0.26 (*)    BLOOD GAS VENOUS - Abnormal    pH Venous 7.39      pCO2 Venous 38 (*)     pO2 Venous 73 (*)     Bicarbonate Venous 23      Base Excess/Deficit Venous -1.4      FIO2 21      Oxyhemoglobin Venous 89 (*)     O2 Sat, Venous 95.0 (*)    ROUTINE UA WITH MICROSCOPIC REFLEX TO CULTURE - Abnormal    Color Urine Colorless      Appearance Urine Clear      Glucose Urine >1000 (*)     Bilirubin Urine Negative      Ketones Urine 10 (*)     Specific Gravity Urine 1.025      Blood Urine Negative      pH Urine 5.0      Protein Albumin Urine Negative      Urobilinogen Urine Normal      Nitrite Urine Negative      Leukocyte Esterase Urine Negative      Mucus Urine Present (*)     RBC Urine <1      WBC Urine <1      Squamous Epithelials Urine <1     KETONE BETA-HYDROXYBUTYRATE QUANTITATIVE, RAPID - Abnormal    Ketone (Beta-Hydroxybutyrate) Quantitative 2.06 (*)    GLUCOSE BY METER - Abnormal    GLUCOSE BY METER POCT >600 (*)    CBC WITH PLATELETS AND DIFFERENTIAL - Abnormal    WBC Count 8.6      RBC Count 4.33 (*)     Hemoglobin 13.3      Hematocrit 35.7 (*)      MCV 82      MCH 30.7      MCHC 37.3 (*)     RDW 13.9      Platelet Count 273      % Neutrophils 69      % Lymphocytes 23      % Monocytes 7      % Eosinophils 0      % Basophils 0      % Immature Granulocytes 1      NRBCs per 100 WBC 0      Absolute Neutrophils 5.9      Absolute Lymphocytes 2.0      Absolute Monocytes 0.6      Absolute Eosinophils 0.0      Absolute Basophils 0.0      Absolute Immature Granulocytes 0.1      Absolute NRBCs 0.0     HEMOGLOBIN A1C - Abnormal    Estimated Average Glucose 280 (*)     Hemoglobin A1C 11.4 (*)    BASIC METABOLIC PANEL - Abnormal    Sodium 132 (*)     Potassium 4.4      Chloride 98      Carbon Dioxide (CO2) 18 (*)     Anion Gap 16 (*)     Urea Nitrogen 21.6 (*)     Creatinine 1.01      GFR Estimate 88      Calcium 8.2 (*)     Glucose 618 (*)    GLUCOSE BY METER - Abnormal    GLUCOSE BY METER POCT 389 (*)    GLUCOSE BY METER - Abnormal    GLUCOSE BY METER POCT 374 (*)    TROPONIN T, HIGH SENSITIVITY - Normal    Troponin T, High Sensitivity 16     LACTIC ACID WHOLE BLOOD WITH 1X REPEAT IN 2 HR WHEN >2 - Normal    Lactic Acid, Initial 1.4     T4 FREE - Normal    Free T4 1.30     TROPONIN T, HIGH SENSITIVITY - Normal    Troponin T, High Sensitivity 16     INFLUENZA A/B, RSV AND SARS-COV2 PCR - Normal    Influenza A PCR Negative      Influenza B PCR Negative      RSV PCR Negative      SARS CoV2 PCR Negative     NT PROBNP INPATIENT - Normal    N terminal Pro BNP Inpatient <36     PHOSPHORUS - Normal    Phosphorus 3.5     GLUCOSE MONITOR NURSING POCT   GLUCOSE MONITOR NURSING POCT   GLUCOSE MONITOR NURSING POCT   BLOOD GAS ARTERIAL   OSMOLALITY   BASIC METABOLIC PANEL   BASIC METABOLIC PANEL   PHOSPHORUS   PHOSPHORUS   OSMOLALITY     RADIOLOGY:  XR Chest Port 1 View   Final Result   IMPRESSION: Negative chest.        EKG:   Performed at: 10:39 PM on April 30, 2025  Impression: Sinus rhythm, no signs of acute ST elevation ischemia, anterior leads with similar findings in V2 and  slight amplitude change in QRS in V3 but not convincing for contiguous ST elevation, no ectopy or signs of atrial fibrillation.  Rate: 93 bpm  Rhythm: Sinus rhythm  QRS Interval: 74 ms  QTc Interval: 432 ms  Comparison: Comparison prior EKG from August 8, 2028, 2023 with amplitude and morphology change in V3.  I have independently reviewed and interpreted the EKG(s) documented above.     PROCEDURES:   Procedures       I, Mik Ann, am serving as a scribe to document services personally performed by Dr. Vijay Whitney  based on my observation and the provider's statements to me. I, Vijay Whitney MD attest that Mik Juarez is acting in a scribe capacity, has observed my performance of the services and has documented them in accordance with my direction.      Vijay Whitney M.D.  Emergency Medicine  [facility] Emergency Department      Vijay Whitney MD  05/01/25 0255

## 2025-05-01 NOTE — PLAN OF CARE
Goal Outcome Evaluation: Arrived to unit around 0300. SBA. A&Ox4. VSS on RA. Insulin gtt per [facility] protocol. 2 RN skin check with Clarisse TANG RN. [facility] protocols. Belongings remain with patient. Patient did not want anyone contacted about his admission until the morning. Blood sugars monitored Q1 hour.     0420 - per Dr Quigley. Insulin gtt can be shut off, no need to wait 2 hours after lantus dose. 20 units of lantus given and insulin gtt shut off. D5 1/2 NS still running at 100. Diabetic diet.     Labs monitored. Call light within reach. Makes needs known. Intentional rounding.       Problem: Comorbidity Management  Goal: Maintenance of Asthma Control  Outcome: Progressing  Intervention: Maintain Asthma Symptom Control  Recent Flowsheet Documentation  Taken 5/1/2025 0400 by Marlin Conner RN  Medication Review/Management: medications reviewed  Goal: Blood Glucose Levels Within Targeted Range  Outcome: Progressing  Intervention: Monitor and Manage Glycemia  Recent Flowsheet Documentation  Taken 5/1/2025 0400 by Marlin Conner RN  Medication Review/Management: medications reviewed